# Patient Record
Sex: FEMALE | Race: WHITE | NOT HISPANIC OR LATINO | URBAN - METROPOLITAN AREA
[De-identification: names, ages, dates, MRNs, and addresses within clinical notes are randomized per-mention and may not be internally consistent; named-entity substitution may affect disease eponyms.]

---

## 2018-05-22 ENCOUNTER — EMERGENCY (EMERGENCY)
Facility: HOSPITAL | Age: 68
LOS: 0 days | Discharge: HOME | End: 2018-05-22
Attending: EMERGENCY MEDICINE
Payer: MEDICARE

## 2018-05-22 VITALS
SYSTOLIC BLOOD PRESSURE: 155 MMHG | HEIGHT: 66 IN | RESPIRATION RATE: 16 BRPM | DIASTOLIC BLOOD PRESSURE: 80 MMHG | OXYGEN SATURATION: 98 % | WEIGHT: 197.09 LBS | HEART RATE: 56 BPM | TEMPERATURE: 99 F

## 2018-05-22 PROCEDURE — 99283 EMERGENCY DEPT VISIT LOW MDM: CPT

## 2018-05-22 PROCEDURE — 73564 X-RAY EXAM KNEE 4 OR MORE: CPT | Mod: 26,LT

## 2018-05-22 PROCEDURE — 93971 EXTREMITY STUDY: CPT | Mod: 26

## 2018-05-22 RX ORDER — ACETAMINOPHEN 500 MG
2 TABLET ORAL
Qty: 40 | Refills: 0 | OUTPATIENT
Start: 2018-05-22 | End: 2018-05-26

## 2018-05-22 RX ORDER — TRAMADOL HYDROCHLORIDE 50 MG/1
1 TABLET ORAL
Qty: 12 | Refills: 0 | OUTPATIENT
Start: 2018-05-22 | End: 2018-05-24

## 2018-05-22 RX ORDER — ACETAMINOPHEN 500 MG
2 TABLET ORAL
Qty: 0 | Refills: 0 | COMMUNITY
Start: 2018-05-22 | End: 2018-05-26

## 2018-05-22 RX ORDER — TRAMADOL HYDROCHLORIDE 50 MG/1
50 TABLET ORAL ONCE
Qty: 0 | Refills: 0 | Status: DISCONTINUED | OUTPATIENT
Start: 2018-05-22 | End: 2018-05-22

## 2018-05-22 RX ADMIN — TRAMADOL HYDROCHLORIDE 50 MILLIGRAM(S): 50 TABLET ORAL at 16:47

## 2018-05-22 NOTE — ED ADULT TRIAGE NOTE - CHIEF COMPLAINT QUOTE
Pain to left knee  and not able to bear weight. While walking up the stairs my knee gave out. and she fell down to the step.

## 2018-05-22 NOTE — ED PROVIDER NOTE - MEDICAL DECISION MAKING DETAILS
pt with baker's cyst leading to fall and knee sprain to dc with follow up with orthopedics - Dr. House if not improved.

## 2018-05-23 DIAGNOSIS — M25.562 PAIN IN LEFT KNEE: ICD-10-CM

## 2018-05-23 DIAGNOSIS — S83.92XA SPRAIN OF UNSPECIFIED SITE OF LEFT KNEE, INITIAL ENCOUNTER: ICD-10-CM
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
## 2018-05-23 DIAGNOSIS — I10 ESSENTIAL (PRIMARY) HYPERTENSION: ICD-10-CM

## 2018-05-23 DIAGNOSIS — M71.22 SYNOVIAL CYST OF POPLITEAL SPACE [BAKER], LEFT KNEE: ICD-10-CM
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
## 2018-07-17 ENCOUNTER — OUTPATIENT (OUTPATIENT)
Dept: OUTPATIENT SERVICES | Facility: HOSPITAL | Age: 68
LOS: 1 days | Discharge: HOME | End: 2018-07-17
Payer: MEDICARE

## 2018-07-17 VITALS
TEMPERATURE: 98 F | OXYGEN SATURATION: 97 % | DIASTOLIC BLOOD PRESSURE: 77 MMHG | RESPIRATION RATE: 18 BRPM | HEART RATE: 57 BPM | WEIGHT: 203.05 LBS | SYSTOLIC BLOOD PRESSURE: 149 MMHG | HEIGHT: 65 IN

## 2018-07-17 DIAGNOSIS — M16.11 UNILATERAL PRIMARY OSTEOARTHRITIS, RIGHT HIP: ICD-10-CM

## 2018-07-17 DIAGNOSIS — I10 ESSENTIAL (PRIMARY) HYPERTENSION: ICD-10-CM

## 2018-07-17 DIAGNOSIS — M25.559 PAIN IN UNSPECIFIED HIP: ICD-10-CM

## 2018-07-17 DIAGNOSIS — Z01.818 ENCOUNTER FOR OTHER PREPROCEDURAL EXAMINATION: ICD-10-CM

## 2018-07-17 DIAGNOSIS — N39.3 STRESS INCONTINENCE (FEMALE) (MALE): Chronic | ICD-10-CM

## 2018-07-17 DIAGNOSIS — E78.5 HYPERLIPIDEMIA, UNSPECIFIED: ICD-10-CM

## 2018-07-17 LAB
ANION GAP SERPL CALC-SCNC: 7 MMOL/L — SIGNIFICANT CHANGE UP (ref 5–17)
APTT BLD: 34.1 SEC — SIGNIFICANT CHANGE UP (ref 27.5–37.4)
BASOPHILS # BLD AUTO: 0.06 K/UL — SIGNIFICANT CHANGE UP (ref 0–0.2)
BASOPHILS NFR BLD AUTO: 0.9 % — SIGNIFICANT CHANGE UP (ref 0–2)
BUN SERPL-MCNC: 19 MG/DL — SIGNIFICANT CHANGE UP (ref 7–23)
CALCIUM SERPL-MCNC: 8.5 MG/DL — SIGNIFICANT CHANGE UP (ref 8.5–10.1)
CHLORIDE SERPL-SCNC: 111 MMOL/L — HIGH (ref 96–108)
CO2 SERPL-SCNC: 24 MMOL/L — SIGNIFICANT CHANGE UP (ref 22–31)
CREAT SERPL-MCNC: 0.86 MG/DL — SIGNIFICANT CHANGE UP (ref 0.5–1.3)
EOSINOPHIL # BLD AUTO: 0.22 K/UL — SIGNIFICANT CHANGE UP (ref 0–0.5)
EOSINOPHIL NFR BLD AUTO: 3.2 % — SIGNIFICANT CHANGE UP (ref 0–6)
GLUCOSE SERPL-MCNC: 107 MG/DL — HIGH (ref 70–99)
HBA1C BLD-MCNC: 6 % — HIGH (ref 4–5.6)
HCT VFR BLD CALC: 43.1 % — SIGNIFICANT CHANGE UP (ref 34.5–45)
HGB BLD-MCNC: 13.8 G/DL — SIGNIFICANT CHANGE UP (ref 11.5–15.5)
IMM GRANULOCYTES NFR BLD AUTO: 0.1 % — SIGNIFICANT CHANGE UP (ref 0–1.5)
INR BLD: 1.02 RATIO — SIGNIFICANT CHANGE UP (ref 0.88–1.16)
LYMPHOCYTES # BLD AUTO: 2.57 K/UL — SIGNIFICANT CHANGE UP (ref 1–3.3)
LYMPHOCYTES # BLD AUTO: 37.8 % — SIGNIFICANT CHANGE UP (ref 13–44)
MCHC RBC-ENTMCNC: 29.2 PG — SIGNIFICANT CHANGE UP (ref 27–34)
MCHC RBC-ENTMCNC: 32 GM/DL — SIGNIFICANT CHANGE UP (ref 32–36)
MCV RBC AUTO: 91.1 FL — SIGNIFICANT CHANGE UP (ref 80–100)
MONOCYTES # BLD AUTO: 0.51 K/UL — SIGNIFICANT CHANGE UP (ref 0–0.9)
MONOCYTES NFR BLD AUTO: 7.5 % — SIGNIFICANT CHANGE UP (ref 2–14)
MRSA PCR RESULT.: SIGNIFICANT CHANGE UP
NEUTROPHILS # BLD AUTO: 3.42 K/UL — SIGNIFICANT CHANGE UP (ref 1.8–7.4)
NEUTROPHILS NFR BLD AUTO: 50.5 % — SIGNIFICANT CHANGE UP (ref 43–77)
PLATELET # BLD AUTO: 254 K/UL — SIGNIFICANT CHANGE UP (ref 150–400)
POTASSIUM SERPL-MCNC: 4.2 MMOL/L — SIGNIFICANT CHANGE UP (ref 3.5–5.3)
POTASSIUM SERPL-SCNC: 4.2 MMOL/L — SIGNIFICANT CHANGE UP (ref 3.5–5.3)
PROTHROM AB SERPL-ACNC: 11.1 SEC — SIGNIFICANT CHANGE UP (ref 9.8–12.7)
RBC # BLD: 4.73 M/UL — SIGNIFICANT CHANGE UP (ref 3.8–5.2)
RBC # FLD: 14.3 % — SIGNIFICANT CHANGE UP (ref 10.3–14.5)
S AUREUS DNA NOSE QL NAA+PROBE: DETECTED
SODIUM SERPL-SCNC: 142 MMOL/L — SIGNIFICANT CHANGE UP (ref 135–145)
WBC # BLD: 6.79 K/UL — SIGNIFICANT CHANGE UP (ref 3.8–10.5)
WBC # FLD AUTO: 6.79 K/UL — SIGNIFICANT CHANGE UP (ref 3.8–10.5)

## 2018-07-17 PROCEDURE — 93010 ELECTROCARDIOGRAM REPORT: CPT | Mod: NC

## 2018-07-17 NOTE — PHYSICAL THERAPY INITIAL EVALUATION ADULT - ADDITIONAL COMMENTS
Pt is R hand dominant, wears glasses, drives. Pt ambulates with a rollator, also uses a straight cane and a rolling walker. pt uses a brace to L knee following torn meniscus. There is a ramp to enter home, 13 steps  to 2nd floor however pt has emptied a room and can stay on the first floor. Bathroom is handicap accessible, tub shower combo, hand held shower head, grab bars and a raised toilet seat. pain is 0/10 at rest can increase to 3/10 with ambulation. Pt currently takes advil, tylenol, aleve, naproxen. no previous PT/OT.

## 2018-07-17 NOTE — H&P PST ADULT - PMH
HTN (hypertension)    Hyperlipidemia HTN (hypertension)    Hyperlipidemia    Obesity (BMI 30.0-34.9)

## 2018-07-17 NOTE — OCCUPATIONAL THERAPY INITIAL EVALUATION ADULT - COORDINATION ASSESSED, REHAB EVAL
finger to nose/heel to shin/intact in BUE; diminished in right LE heel to shin/finger to nose/intact in BUE; diminished in right LE

## 2018-07-17 NOTE — OCCUPATIONAL THERAPY INITIAL EVALUATION ADULT - ADDITIONAL COMMENTS
Pt is functioning in her roles, self sufficient, driving & ambulating independently in the community with a rollator; pt intermittently uses a SAC or rolling walker depending on the intensity of pain in her right hip. Pt takes over the counter medication PRN for pain relief. Pt denied pain at rest, but c/o 3/10 with ambulation.  Pt scores 90% of patient specific scale Pt is right hand dominant and wears glasses for reading.

## 2018-07-17 NOTE — PHYSICAL THERAPY INITIAL EVALUATION ADULT - RANGE OF MOTION EXAMINATION, REHAB EVAL
R hip AROM: flexion = 50 degrees, abduction = 5 degrees. L hip AROM: flexion = 65 degrees, abduction = 10 degrees.

## 2018-07-17 NOTE — OCCUPATIONAL THERAPY INITIAL EVALUATION ADULT - LIVES WITH, PROFILE
her 98 y/o  mother  in a private house with a ramp. Once inside, pt has to negotiate a flight of stairs to access the bedroom and bathroom. Pt has an empty room on the main floor where  she plans to stay and recover after surgery. The bathroom on the main floor is handicapped accessible , with raise tooilet seat , grab bars , tub/shower combination with shower chair. her 98 y/o mother in a private house with a ramp. Once inside, pt has to negotiate a flight of stairs to access the bedroom and bathroom. Pt has an empty room on the main floor where she plans to stay and recover after surgery. The bathroom on the main floor is handicapped accessible, with raised toilet seat , grab bars , tub/shower combination and shower chair.

## 2018-07-17 NOTE — OCCUPATIONAL THERAPY INITIAL EVALUATION ADULT - SOCIAL CONCERNS
Complex psychosocial needs/coping issues/Pt voiced concerns about her recovery at home, because her elderly mother does not like any one to come to her house.

## 2018-07-17 NOTE — OCCUPATIONAL THERAPY INITIAL EVALUATION ADULT - PERTINENT HX OF CURRENT PROBLEM, REHAB EVAL
Pt is slated for elective surgery for right THR due to OA, pain and DJD. Pt has a history of multiple comorbidities.

## 2018-07-17 NOTE — OCCUPATIONAL THERAPY INITIAL EVALUATION ADULT - RANGE OF MOTION EXAMINATION, LOWER EXTREMITY
Right LE Active Assistive ROM was WFL  (within functional limits)/Right LE Passive ROM was WFL  (within functional limits)/Left LE Passive ROM was WNL (within normal limits)/Left LE Active ROM was WNL  (within normal limits)

## 2018-07-17 NOTE — OCCUPATIONAL THERAPY INITIAL EVALUATION ADULT - ANTICIPATED DISCHARGE DISPOSITION, OT EVAL
Recommend home with 3-in-1 commode  and OT referral to enable patient to safely perform ADL management and functional mobility. Pt owns a rolling walker .

## 2018-07-17 NOTE — PHYSICAL THERAPY INITIAL EVALUATION ADULT - MODIFIED CLINICAL TEST OF SENSORY INTEGRATION IN BALANCE TEST
5x Sit to Stand Test = (B hands used) 24.97 seconds, indicating significant impairment c functional mobility & strength  ; 2 Minute Walk Test = 325 feet with rollator and rest stops

## 2018-07-18 PROCEDURE — 73502 X-RAY EXAM HIP UNI 2-3 VIEWS: CPT | Mod: 26,RT

## 2018-07-18 RX ORDER — MUPIROCIN 20 MG/G
1 OINTMENT TOPICAL
Qty: 1 | Refills: 0 | OUTPATIENT
Start: 2018-07-18 | End: 2018-07-22

## 2018-07-27 RX ORDER — SODIUM CHLORIDE 9 MG/ML
3 INJECTION INTRAMUSCULAR; INTRAVENOUS; SUBCUTANEOUS EVERY 8 HOURS
Qty: 0 | Refills: 0 | Status: DISCONTINUED | OUTPATIENT
Start: 2018-07-30 | End: 2018-08-01

## 2018-07-29 ENCOUNTER — TRANSCRIPTION ENCOUNTER (OUTPATIENT)
Age: 68
End: 2018-07-29

## 2018-07-30 ENCOUNTER — INPATIENT (INPATIENT)
Facility: HOSPITAL | Age: 68
LOS: 1 days | Discharge: NURSING, REHAB OR LONG-TERM CARE | End: 2018-08-01
Attending: ORTHOPAEDIC SURGERY | Admitting: ORTHOPAEDIC SURGERY
Payer: MEDICARE

## 2018-07-30 ENCOUNTER — RESULT REVIEW (OUTPATIENT)
Age: 68
End: 2018-07-30

## 2018-07-30 VITALS
HEIGHT: 65 IN | SYSTOLIC BLOOD PRESSURE: 126 MMHG | DIASTOLIC BLOOD PRESSURE: 55 MMHG | HEART RATE: 56 BPM | RESPIRATION RATE: 16 BRPM | OXYGEN SATURATION: 96 % | WEIGHT: 199.96 LBS | TEMPERATURE: 98 F

## 2018-07-30 DIAGNOSIS — N39.3 STRESS INCONTINENCE (FEMALE) (MALE): Chronic | ICD-10-CM

## 2018-07-30 LAB
ANION GAP SERPL CALC-SCNC: 8 MMOL/L — SIGNIFICANT CHANGE UP (ref 5–17)
BUN SERPL-MCNC: 20 MG/DL — SIGNIFICANT CHANGE UP (ref 7–23)
CALCIUM SERPL-MCNC: 8.5 MG/DL — SIGNIFICANT CHANGE UP (ref 8.5–10.1)
CHLORIDE SERPL-SCNC: 108 MMOL/L — SIGNIFICANT CHANGE UP (ref 96–108)
CO2 SERPL-SCNC: 26 MMOL/L — SIGNIFICANT CHANGE UP (ref 22–31)
CREAT SERPL-MCNC: 0.84 MG/DL — SIGNIFICANT CHANGE UP (ref 0.5–1.3)
GLUCOSE SERPL-MCNC: 100 MG/DL — HIGH (ref 70–99)
HCT VFR BLD CALC: 40 % — SIGNIFICANT CHANGE UP (ref 34.5–45)
HGB BLD-MCNC: 12.8 G/DL — SIGNIFICANT CHANGE UP (ref 11.5–15.5)
MCHC RBC-ENTMCNC: 29.4 PG — SIGNIFICANT CHANGE UP (ref 27–34)
MCHC RBC-ENTMCNC: 32 GM/DL — SIGNIFICANT CHANGE UP (ref 32–36)
MCV RBC AUTO: 91.7 FL — SIGNIFICANT CHANGE UP (ref 80–100)
NRBC # BLD: 0 /100 WBCS — SIGNIFICANT CHANGE UP (ref 0–0)
PLATELET # BLD AUTO: 212 K/UL — SIGNIFICANT CHANGE UP (ref 150–400)
POTASSIUM SERPL-MCNC: 3.5 MMOL/L — SIGNIFICANT CHANGE UP (ref 3.5–5.3)
POTASSIUM SERPL-SCNC: 3.5 MMOL/L — SIGNIFICANT CHANGE UP (ref 3.5–5.3)
RBC # BLD: 4.36 M/UL — SIGNIFICANT CHANGE UP (ref 3.8–5.2)
RBC # FLD: 14.1 % — SIGNIFICANT CHANGE UP (ref 10.3–14.5)
SODIUM SERPL-SCNC: 142 MMOL/L — SIGNIFICANT CHANGE UP (ref 135–145)
WBC # BLD: 9.32 K/UL — SIGNIFICANT CHANGE UP (ref 3.8–10.5)
WBC # FLD AUTO: 9.32 K/UL — SIGNIFICANT CHANGE UP (ref 3.8–10.5)

## 2018-07-30 PROCEDURE — 88311 DECALCIFY TISSUE: CPT | Mod: 26

## 2018-07-30 PROCEDURE — 88305 TISSUE EXAM BY PATHOLOGIST: CPT | Mod: 26

## 2018-07-30 RX ORDER — METOPROLOL TARTRATE 50 MG
25 TABLET ORAL
Qty: 0 | Refills: 0 | Status: DISCONTINUED | OUTPATIENT
Start: 2018-07-30 | End: 2018-08-01

## 2018-07-30 RX ORDER — ENOXAPARIN SODIUM 100 MG/ML
40 INJECTION SUBCUTANEOUS EVERY 24 HOURS
Qty: 0 | Refills: 0 | Status: DISCONTINUED | OUTPATIENT
Start: 2018-07-31 | End: 2018-08-01

## 2018-07-30 RX ORDER — ACETAMINOPHEN 500 MG
650 TABLET ORAL EVERY 6 HOURS
Qty: 0 | Refills: 0 | Status: DISCONTINUED | OUTPATIENT
Start: 2018-07-30 | End: 2018-08-01

## 2018-07-30 RX ORDER — CEFAZOLIN SODIUM 1 G
2000 VIAL (EA) INJECTION EVERY 8 HOURS
Qty: 0 | Refills: 0 | Status: COMPLETED | OUTPATIENT
Start: 2018-07-30 | End: 2018-07-31

## 2018-07-30 RX ORDER — SENNA PLUS 8.6 MG/1
2 TABLET ORAL AT BEDTIME
Qty: 0 | Refills: 0 | Status: DISCONTINUED | OUTPATIENT
Start: 2018-07-30 | End: 2018-08-01

## 2018-07-30 RX ORDER — LOSARTAN POTASSIUM 100 MG/1
50 TABLET, FILM COATED ORAL DAILY
Qty: 0 | Refills: 0 | Status: DISCONTINUED | OUTPATIENT
Start: 2018-07-30 | End: 2018-08-01

## 2018-07-30 RX ORDER — MORPHINE SULFATE 50 MG/1
4 CAPSULE, EXTENDED RELEASE ORAL EVERY 4 HOURS
Qty: 0 | Refills: 0 | Status: DISCONTINUED | OUTPATIENT
Start: 2018-07-30 | End: 2018-08-01

## 2018-07-30 RX ORDER — OXYCODONE HYDROCHLORIDE 5 MG/1
5 TABLET ORAL EVERY 4 HOURS
Qty: 0 | Refills: 0 | Status: DISCONTINUED | OUTPATIENT
Start: 2018-07-30 | End: 2018-08-01

## 2018-07-30 RX ORDER — HYDROCHLOROTHIAZIDE 25 MG
25 TABLET ORAL DAILY
Qty: 0 | Refills: 0 | Status: DISCONTINUED | OUTPATIENT
Start: 2018-07-30 | End: 2018-08-01

## 2018-07-30 RX ORDER — SODIUM CHLORIDE 9 MG/ML
1000 INJECTION, SOLUTION INTRAVENOUS
Qty: 0 | Refills: 0 | Status: DISCONTINUED | OUTPATIENT
Start: 2018-07-30 | End: 2018-07-31

## 2018-07-30 RX ORDER — ACETAMINOPHEN 500 MG
1000 TABLET ORAL ONCE
Qty: 0 | Refills: 0 | Status: DISCONTINUED | OUTPATIENT
Start: 2018-07-30 | End: 2018-08-01

## 2018-07-30 RX ORDER — ONDANSETRON 8 MG/1
4 TABLET, FILM COATED ORAL ONCE
Qty: 0 | Refills: 0 | Status: DISCONTINUED | OUTPATIENT
Start: 2018-07-30 | End: 2018-07-30

## 2018-07-30 RX ORDER — ACETAMINOPHEN 500 MG
650 TABLET ORAL ONCE
Qty: 0 | Refills: 0 | Status: COMPLETED | OUTPATIENT
Start: 2018-07-30 | End: 2018-07-30

## 2018-07-30 RX ORDER — OXYCODONE HYDROCHLORIDE 5 MG/1
10 TABLET ORAL ONCE
Qty: 0 | Refills: 0 | Status: DISCONTINUED | OUTPATIENT
Start: 2018-07-30 | End: 2018-07-30

## 2018-07-30 RX ORDER — OXYCODONE HYDROCHLORIDE 5 MG/1
10 TABLET ORAL EVERY 4 HOURS
Qty: 0 | Refills: 0 | Status: DISCONTINUED | OUTPATIENT
Start: 2018-07-30 | End: 2018-08-01

## 2018-07-30 RX ORDER — ATORVASTATIN CALCIUM 80 MG/1
20 TABLET, FILM COATED ORAL AT BEDTIME
Qty: 0 | Refills: 0 | Status: DISCONTINUED | OUTPATIENT
Start: 2018-07-30 | End: 2018-08-01

## 2018-07-30 RX ORDER — ACETAMINOPHEN 500 MG
1000 TABLET ORAL ONCE
Qty: 0 | Refills: 0 | Status: COMPLETED | OUTPATIENT
Start: 2018-07-30 | End: 2018-07-30

## 2018-07-30 RX ORDER — POLYETHYLENE GLYCOL 3350 17 G/17G
17 POWDER, FOR SOLUTION ORAL DAILY
Qty: 0 | Refills: 0 | Status: DISCONTINUED | OUTPATIENT
Start: 2018-07-30 | End: 2018-08-01

## 2018-07-30 RX ORDER — DIPHENHYDRAMINE HCL 50 MG
25 CAPSULE ORAL EVERY 4 HOURS
Qty: 0 | Refills: 0 | Status: DISCONTINUED | OUTPATIENT
Start: 2018-07-30 | End: 2018-08-01

## 2018-07-30 RX ORDER — SODIUM CHLORIDE 9 MG/ML
1000 INJECTION, SOLUTION INTRAVENOUS
Qty: 0 | Refills: 0 | Status: DISCONTINUED | OUTPATIENT
Start: 2018-07-30 | End: 2018-07-30

## 2018-07-30 RX ORDER — BENZOCAINE AND MENTHOL 5; 1 G/100ML; G/100ML
1 LIQUID ORAL
Qty: 0 | Refills: 0 | Status: DISCONTINUED | OUTPATIENT
Start: 2018-07-30 | End: 2018-08-01

## 2018-07-30 RX ORDER — DOCUSATE SODIUM 100 MG
100 CAPSULE ORAL THREE TIMES A DAY
Qty: 0 | Refills: 0 | Status: DISCONTINUED | OUTPATIENT
Start: 2018-07-30 | End: 2018-08-01

## 2018-07-30 RX ORDER — PANTOPRAZOLE SODIUM 20 MG/1
40 TABLET, DELAYED RELEASE ORAL DAILY
Qty: 0 | Refills: 0 | Status: DISCONTINUED | OUTPATIENT
Start: 2018-07-30 | End: 2018-08-01

## 2018-07-30 RX ORDER — FENTANYL CITRATE 50 UG/ML
50 INJECTION INTRAVENOUS
Qty: 0 | Refills: 0 | Status: DISCONTINUED | OUTPATIENT
Start: 2018-07-30 | End: 2018-07-30

## 2018-07-30 RX ORDER — MAGNESIUM HYDROXIDE 400 MG/1
30 TABLET, CHEWABLE ORAL DAILY
Qty: 0 | Refills: 0 | Status: DISCONTINUED | OUTPATIENT
Start: 2018-07-30 | End: 2018-08-01

## 2018-07-30 RX ORDER — ONDANSETRON 8 MG/1
4 TABLET, FILM COATED ORAL EVERY 6 HOURS
Qty: 0 | Refills: 0 | Status: DISCONTINUED | OUTPATIENT
Start: 2018-07-30 | End: 2018-08-01

## 2018-07-30 RX ORDER — DIPHENHYDRAMINE HCL 50 MG
25 CAPSULE ORAL AT BEDTIME
Qty: 0 | Refills: 0 | Status: DISCONTINUED | OUTPATIENT
Start: 2018-07-30 | End: 2018-08-01

## 2018-07-30 RX ORDER — BUPROPION HYDROCHLORIDE 150 MG/1
150 TABLET, EXTENDED RELEASE ORAL DAILY
Qty: 0 | Refills: 0 | Status: DISCONTINUED | OUTPATIENT
Start: 2018-07-30 | End: 2018-08-01

## 2018-07-30 RX ADMIN — Medication 1000 MILLIGRAM(S): at 17:40

## 2018-07-30 RX ADMIN — Medication 100 MILLIGRAM(S): at 22:00

## 2018-07-30 RX ADMIN — SODIUM CHLORIDE 3 MILLILITER(S): 9 INJECTION INTRAMUSCULAR; INTRAVENOUS; SUBCUTANEOUS at 22:03

## 2018-07-30 RX ADMIN — Medication 650 MILLIGRAM(S): at 11:56

## 2018-07-30 RX ADMIN — SODIUM CHLORIDE 75 MILLILITER(S): 9 INJECTION, SOLUTION INTRAVENOUS at 17:40

## 2018-07-30 RX ADMIN — OXYCODONE HYDROCHLORIDE 10 MILLIGRAM(S): 5 TABLET ORAL at 11:56

## 2018-07-30 RX ADMIN — ATORVASTATIN CALCIUM 20 MILLIGRAM(S): 80 TABLET, FILM COATED ORAL at 22:00

## 2018-07-30 RX ADMIN — Medication 400 MILLIGRAM(S): at 17:20

## 2018-07-30 RX ADMIN — SODIUM CHLORIDE 75 MILLILITER(S): 9 INJECTION, SOLUTION INTRAVENOUS at 23:02

## 2018-07-30 RX ADMIN — Medication 1 TABLET(S): at 22:00

## 2018-07-30 RX ADMIN — OXYCODONE HYDROCHLORIDE 10 MILLIGRAM(S): 5 TABLET ORAL at 22:50

## 2018-07-30 RX ADMIN — Medication 100 MILLIGRAM(S): at 22:58

## 2018-07-30 RX ADMIN — OXYCODONE HYDROCHLORIDE 10 MILLIGRAM(S): 5 TABLET ORAL at 21:58

## 2018-07-30 NOTE — OCCUPATIONAL THERAPY INITIAL EVALUATION ADULT - PLANNED THERAPY INTERVENTIONS, OT EVAL
parent/caregiver training.../transfer training/balance training/ADL retraining/bed mobility training/ROM/strengthening

## 2018-07-30 NOTE — OCCUPATIONAL THERAPY INITIAL EVALUATION ADULT - RANGE OF MOTION EXAMINATION, LOWER EXTREMITY
Left LE Active ROM was WFL (within functional limits)/right LE hip precautions, decreased hip flexion/extension

## 2018-07-30 NOTE — OCCUPATIONAL THERAPY INITIAL EVALUATION ADULT - ADDITIONAL COMMENTS
PST-Lives With: her 98 y/o mother in a private house with a ramp. Once inside, pt has to negotiate a flight of stairs to access the bedroom and bathroom. Pt has an empty room on the main floor where she plans to stay and recover after surgery. The bathroom on the main floor is handicapped accessible, with raised toilet seat , grab bars , tub/shower combination and shower chair.

## 2018-07-30 NOTE — OCCUPATIONAL THERAPY INITIAL EVALUATION ADULT - NS ASR OT EQUIP NEEDS DISCH
bedside commode/rolling walker (5 inch wheels)/patient provided hip kit and educated on recommended equipment

## 2018-07-30 NOTE — PHYSICAL THERAPY INITIAL EVALUATION ADULT - COORDINATION ASSESSED, REHAB EVAL
sluggish movt RLe due to pain and weakness/heel to shin heel to shin/sluggish movt RLe due to pain and weakness

## 2018-07-30 NOTE — PHYSICAL THERAPY INITIAL EVALUATION ADULT - CRITERIA FOR SKILLED THERAPEUTIC INTERVENTIONS
functional limitations in following categories/risk reduction/prevention/rehab potential/anticipated discharge recommendation

## 2018-07-31 ENCOUNTER — TRANSCRIPTION ENCOUNTER (OUTPATIENT)
Age: 68
End: 2018-07-31

## 2018-07-31 LAB
ANION GAP SERPL CALC-SCNC: 10 MMOL/L — SIGNIFICANT CHANGE UP (ref 5–17)
BUN SERPL-MCNC: 19 MG/DL — SIGNIFICANT CHANGE UP (ref 7–23)
CALCIUM SERPL-MCNC: 9.1 MG/DL — SIGNIFICANT CHANGE UP (ref 8.5–10.1)
CHLORIDE SERPL-SCNC: 102 MMOL/L — SIGNIFICANT CHANGE UP (ref 96–108)
CO2 SERPL-SCNC: 29 MMOL/L — SIGNIFICANT CHANGE UP (ref 22–31)
CREAT SERPL-MCNC: 0.81 MG/DL — SIGNIFICANT CHANGE UP (ref 0.5–1.3)
GLUCOSE SERPL-MCNC: 131 MG/DL — HIGH (ref 70–99)
HCT VFR BLD CALC: 40.1 % — SIGNIFICANT CHANGE UP (ref 34.5–45)
HGB BLD-MCNC: 13 G/DL — SIGNIFICANT CHANGE UP (ref 11.5–15.5)
MCHC RBC-ENTMCNC: 29.3 PG — SIGNIFICANT CHANGE UP (ref 27–34)
MCHC RBC-ENTMCNC: 32.4 GM/DL — SIGNIFICANT CHANGE UP (ref 32–36)
MCV RBC AUTO: 90.5 FL — SIGNIFICANT CHANGE UP (ref 80–100)
NRBC # BLD: 0 /100 WBCS — SIGNIFICANT CHANGE UP (ref 0–0)
PLATELET # BLD AUTO: 245 K/UL — SIGNIFICANT CHANGE UP (ref 150–400)
POTASSIUM SERPL-MCNC: 3.6 MMOL/L — SIGNIFICANT CHANGE UP (ref 3.5–5.3)
POTASSIUM SERPL-SCNC: 3.6 MMOL/L — SIGNIFICANT CHANGE UP (ref 3.5–5.3)
RBC # BLD: 4.43 M/UL — SIGNIFICANT CHANGE UP (ref 3.8–5.2)
RBC # FLD: 13.9 % — SIGNIFICANT CHANGE UP (ref 10.3–14.5)
SODIUM SERPL-SCNC: 141 MMOL/L — SIGNIFICANT CHANGE UP (ref 135–145)
WBC # BLD: 11.55 K/UL — HIGH (ref 3.8–10.5)
WBC # FLD AUTO: 11.55 K/UL — HIGH (ref 3.8–10.5)

## 2018-07-31 RX ORDER — DOCUSATE SODIUM 100 MG
1 CAPSULE ORAL
Qty: 21 | Refills: 0
Start: 2018-07-31 | End: 2018-08-06

## 2018-07-31 RX ORDER — ENOXAPARIN SODIUM 100 MG/ML
40 INJECTION SUBCUTANEOUS
Qty: 30 | Refills: 0
Start: 2018-07-31 | End: 2018-08-29

## 2018-07-31 RX ORDER — ACETAMINOPHEN 500 MG
1 TABLET ORAL
Qty: 0 | Refills: 0 | COMMUNITY

## 2018-07-31 RX ADMIN — OXYCODONE HYDROCHLORIDE 10 MILLIGRAM(S): 5 TABLET ORAL at 12:37

## 2018-07-31 RX ADMIN — SODIUM CHLORIDE 3 MILLILITER(S): 9 INJECTION INTRAMUSCULAR; INTRAVENOUS; SUBCUTANEOUS at 13:44

## 2018-07-31 RX ADMIN — Medication 25 MILLIGRAM(S): at 18:54

## 2018-07-31 RX ADMIN — SODIUM CHLORIDE 75 MILLILITER(S): 9 INJECTION, SOLUTION INTRAVENOUS at 05:56

## 2018-07-31 RX ADMIN — LOSARTAN POTASSIUM 50 MILLIGRAM(S): 100 TABLET, FILM COATED ORAL at 05:54

## 2018-07-31 RX ADMIN — SODIUM CHLORIDE 3 MILLILITER(S): 9 INJECTION INTRAMUSCULAR; INTRAVENOUS; SUBCUTANEOUS at 21:20

## 2018-07-31 RX ADMIN — SODIUM CHLORIDE 3 MILLILITER(S): 9 INJECTION INTRAMUSCULAR; INTRAVENOUS; SUBCUTANEOUS at 05:55

## 2018-07-31 RX ADMIN — OXYCODONE HYDROCHLORIDE 10 MILLIGRAM(S): 5 TABLET ORAL at 13:24

## 2018-07-31 RX ADMIN — OXYCODONE HYDROCHLORIDE 10 MILLIGRAM(S): 5 TABLET ORAL at 22:20

## 2018-07-31 RX ADMIN — Medication 100 MILLIGRAM(S): at 05:54

## 2018-07-31 RX ADMIN — OXYCODONE HYDROCHLORIDE 10 MILLIGRAM(S): 5 TABLET ORAL at 23:20

## 2018-07-31 RX ADMIN — PANTOPRAZOLE SODIUM 40 MILLIGRAM(S): 20 TABLET, DELAYED RELEASE ORAL at 12:37

## 2018-07-31 RX ADMIN — Medication 100 MILLIGRAM(S): at 13:42

## 2018-07-31 RX ADMIN — POLYETHYLENE GLYCOL 3350 17 GRAM(S): 17 POWDER, FOR SOLUTION ORAL at 12:38

## 2018-07-31 RX ADMIN — Medication 1 TABLET(S): at 22:20

## 2018-07-31 RX ADMIN — Medication 1 TABLET(S): at 12:37

## 2018-07-31 RX ADMIN — BUPROPION HYDROCHLORIDE 150 MILLIGRAM(S): 150 TABLET, EXTENDED RELEASE ORAL at 12:38

## 2018-07-31 RX ADMIN — Medication 1 TABLET(S): at 05:54

## 2018-07-31 RX ADMIN — ENOXAPARIN SODIUM 40 MILLIGRAM(S): 100 INJECTION SUBCUTANEOUS at 05:54

## 2018-07-31 RX ADMIN — Medication 1 TABLET(S): at 13:42

## 2018-07-31 RX ADMIN — Medication 25 MILLIGRAM(S): at 05:54

## 2018-07-31 NOTE — DISCHARGE NOTE ADULT - NS AS ACTIVITY OBS
Walking-Outdoors allowed/Stairs allowed/Walking-Indoors allowed Walking-Indoors allowed/Stairs allowed/Walking-Outdoors allowed/No Heavy lifting/straining

## 2018-07-31 NOTE — DISCHARGE NOTE ADULT - MEDICATION SUMMARY - MEDICATIONS TO STOP TAKING
I will STOP taking the medications listed below when I get home from the hospital:    acetaminophen 500 mg oral tablet  -- 2 tab(s) by mouth 2 times a day, As Needed  -- This product contains acetaminophen.  Do not use  with any other product containing acetaminophen to prevent possible liver damage.    Aleve  -- 1 tab(s) by mouth once a day, As Needed    Tylenol 500 mg oral tablet  -- 1 dose(s) by mouth 2 times a day, As Needed    mupirocin 2% topical ointment  -- Apply on skin to affected area 2 times a day MDD:2 apply nasally bilateral  -- For external use only.

## 2018-07-31 NOTE — DISCHARGE NOTE ADULT - PATIENT PORTAL LINK FT
You can access the [facility] Patient Portal, offered by [facility], by registering with the following website: [email or web address]

## 2018-07-31 NOTE — DISCHARGE NOTE ADULT - MEDICATION SUMMARY - MEDICATIONS TO TAKE
I will START or STAY ON the medications listed below when I get home from the hospital:    buPROPion HCL SR  150 MG  -- 1 tab(s) by mouth once a day  -- Indication: For home med    Percocet 5/325 oral tablet  -- 1 tab(s) by mouth every 4 to 6 hours, As Needed -for severe pain MDD:6   -- Caution federal law prohibits the transfer of this drug to any person other  than the person for whom it was prescribed.  May cause drowsiness.  Alcohol may intensify this effect.  Use care when operating dangerous machinery.  This prescription cannot be refilled.  This product contains acetaminophen.  Do not use  with any other product containing acetaminophen to prevent possible liver damage.  Using more of this medication than prescribed may cause serious breathing problems.    -- Indication: For prn for pain    losartan 50 mg oral tablet  -- 1 tab(s) by mouth once a day  -- Indication: For home med    Lovenox 40 mg/0.4 mL injectable solution  -- 40 milligram(s) subcutaneously once a day,  For DVT PPX. Do not skip doses.  -- It is very important that you take or use this exactly as directed.  Do not skip doses or discontinue unless directed by your doctor.    -- Indication: For DVT PPx    rosuvastatin 5 mg oral tablet  -- 1 tab(s) by mouth every other day  -- Indication: For home med    Metoprolol Tartrate 25 mg oral tablet  -- 1 tab(s) by mouth 2 times a day  -- Indication: For home med    hydroCHLOROthiazide 25 mg oral tablet  -- 1 tab(s) by mouth once a day  -- Indication: For home med    Colace 100 mg oral capsule  -- 1 cap(s) by mouth 3 times a day   -- Medication should be taken with plenty of water.    -- Indication: For prn for constipation    Vitamin D3 1000 intl units oral capsule  -- 1 cap(s) by mouth once a day  -- Indication: For home med

## 2018-07-31 NOTE — DISCHARGE NOTE ADULT - HOSPITAL COURSE
The patient is a 67 year old F status post elective Total Hip Arthroplasty to the R hip after failing outpatient non-operative conservative management.  Patient presented to [facility] after being medically cleared for an elective surgical procedure. The patient was taken to the operating room on date mentioned above. Prophylactic antibiotics were started before the procedure and continued for 24 hours.  There were no complications during the procedure and patient tolerated the procedure well.  The patient was transferred to recovery room in stable condition and subsequently to surgical floor.  Patient was placed Lovenox for anticoagulation.  All home medications were continued.  The patient received physical therapy daily and daily labs were followed.  The dressing was kept clean, dry, intact.  The rest of the hospital stay was unremarkable. The patient is a 67 year old Female status post elective Total Hip Arthroplasty to the Right hip after failing outpatient non-operative conservative management.  Patient presented to [facility] after being medically cleared for an elective surgical procedure. The patient was taken to the operating room on date mentioned above. Prophylactic antibiotics were started before the procedure and continued for 24 hours.  There were no complications during the procedure and patient tolerated the procedure well.  The patient was transferred to recovery room in stable condition and subsequently to surgical floor.  Patient was placed Lovenox for anticoagulation.  All home medications were continued.  The patient received physical therapy daily and daily labs were followed.  The dressing was kept clean, dry, intact.  The rest of the hospital stay was unremarkable.

## 2018-07-31 NOTE — DISCHARGE NOTE ADULT - CARE PLAN
Principal Discharge DX:	Osteoarthritis  Goal:	Return to ADLs  Assessment and plan of treatment:	1.	Pain Control  2.	Walking with full weight bearing as tolerated, with assistive devices (walker/Cane as Needed). Posterior hip precautions. Abduction pillow while in bed.  3.	DVT Prophylaxis for 30 days (Lovenox 40mg once daily). Do not skip doses.  4.	PT as needed  5.	Follow up with Dr. Petersen as Outpatient in 10-14 Days after Discharge from the Hospital or Rehab. Call Office For Appointment.  6.	Remove Staples Post-Op Day 14, and Remove Dressing Post-Op Day 10, with Daily Dressing Changes as Need.  7.	Ice affected area as Needed  8.	Keep Dressing  Clean and dry. Principal Discharge DX:	Osteoarthritis  Goal:	Return to baseline ADLs  Assessment and plan of treatment:	1.	Pain Control  2.	Walking with full weight bearing as tolerated, with assistive devices (walker/Cane as Needed). Posterior hip precautions. Abduction pillow while in bed.  3.	DVT Prophylaxis for 30 days (Lovenox 40mg once daily). Do not skip doses.  4.	PT as needed  5.	Follow up with Dr. Petersen as Outpatient in 10-14 Days after Discharge from the Hospital or Rehab. Call Office For Appointment.  6.	Remove Staples Post-Op Day 14, and Remove Dressing Post-Op Day 10, with Daily Dressing Changes as Need.  7.	Ice affected area as Needed  8.	Keep Dressing  Clean and dry.

## 2018-07-31 NOTE — DISCHARGE NOTE ADULT - PLAN OF CARE
Return to ADLs 1.	Pain Control  2.	Walking with full weight bearing as tolerated, with assistive devices (walker/Cane as Needed). Posterior hip precautions. Abduction pillow while in bed.  3.	DVT Prophylaxis for 30 days (Lovenox 40mg once daily). Do not skip doses.  4.	PT as needed  5.	Follow up with Dr. Petersen as Outpatient in 10-14 Days after Discharge from the Hospital or Rehab. Call Office For Appointment.  6.	Remove Staples Post-Op Day 14, and Remove Dressing Post-Op Day 10, with Daily Dressing Changes as Need.  7.	Ice affected area as Needed  8.	Keep Dressing  Clean and dry. Return to baseline ADLs

## 2018-07-31 NOTE — DISCHARGE NOTE ADULT - CARE PROVIDER_API CALL
Derrick Petersen (DO), Orthopaedic Surgery  [address] [place] [address]  Phone: [phone]  Fax: [phone]

## 2018-08-01 VITALS
RESPIRATION RATE: 17 BRPM | SYSTOLIC BLOOD PRESSURE: 129 MMHG | DIASTOLIC BLOOD PRESSURE: 58 MMHG | TEMPERATURE: 100 F | OXYGEN SATURATION: 95 % | HEART RATE: 72 BPM

## 2018-08-01 LAB
ANION GAP SERPL CALC-SCNC: 10 MMOL/L — SIGNIFICANT CHANGE UP (ref 5–17)
BUN SERPL-MCNC: 18 MG/DL — SIGNIFICANT CHANGE UP (ref 7–23)
CALCIUM SERPL-MCNC: 8.5 MG/DL — SIGNIFICANT CHANGE UP (ref 8.5–10.1)
CHLORIDE SERPL-SCNC: 105 MMOL/L — SIGNIFICANT CHANGE UP (ref 96–108)
CO2 SERPL-SCNC: 27 MMOL/L — SIGNIFICANT CHANGE UP (ref 22–31)
CREAT SERPL-MCNC: 0.68 MG/DL — SIGNIFICANT CHANGE UP (ref 0.5–1.3)
GLUCOSE SERPL-MCNC: 104 MG/DL — HIGH (ref 70–99)
HCT VFR BLD CALC: 35.7 % — SIGNIFICANT CHANGE UP (ref 34.5–45)
HGB BLD-MCNC: 11.7 G/DL — SIGNIFICANT CHANGE UP (ref 11.5–15.5)
MCHC RBC-ENTMCNC: 29.5 PG — SIGNIFICANT CHANGE UP (ref 27–34)
MCHC RBC-ENTMCNC: 32.8 GM/DL — SIGNIFICANT CHANGE UP (ref 32–36)
MCV RBC AUTO: 90.2 FL — SIGNIFICANT CHANGE UP (ref 80–100)
NRBC # BLD: 0 /100 WBCS — SIGNIFICANT CHANGE UP (ref 0–0)
PLATELET # BLD AUTO: 203 K/UL — SIGNIFICANT CHANGE UP (ref 150–400)
POTASSIUM SERPL-MCNC: 3.5 MMOL/L — SIGNIFICANT CHANGE UP (ref 3.5–5.3)
POTASSIUM SERPL-SCNC: 3.5 MMOL/L — SIGNIFICANT CHANGE UP (ref 3.5–5.3)
RBC # BLD: 3.96 M/UL — SIGNIFICANT CHANGE UP (ref 3.8–5.2)
RBC # FLD: 14.3 % — SIGNIFICANT CHANGE UP (ref 10.3–14.5)
SODIUM SERPL-SCNC: 142 MMOL/L — SIGNIFICANT CHANGE UP (ref 135–145)
WBC # BLD: 11.66 K/UL — HIGH (ref 3.8–10.5)
WBC # FLD AUTO: 11.66 K/UL — HIGH (ref 3.8–10.5)

## 2018-08-01 RX ADMIN — BUPROPION HYDROCHLORIDE 150 MILLIGRAM(S): 150 TABLET, EXTENDED RELEASE ORAL at 12:38

## 2018-08-01 RX ADMIN — Medication 1 TABLET(S): at 12:38

## 2018-08-01 RX ADMIN — POLYETHYLENE GLYCOL 3350 17 GRAM(S): 17 POWDER, FOR SOLUTION ORAL at 12:39

## 2018-08-01 RX ADMIN — Medication 650 MILLIGRAM(S): at 00:14

## 2018-08-01 RX ADMIN — Medication 100 MILLIGRAM(S): at 14:23

## 2018-08-01 RX ADMIN — Medication 1 TABLET(S): at 14:22

## 2018-08-01 RX ADMIN — OXYCODONE HYDROCHLORIDE 10 MILLIGRAM(S): 5 TABLET ORAL at 07:11

## 2018-08-01 RX ADMIN — ENOXAPARIN SODIUM 40 MILLIGRAM(S): 100 INJECTION SUBCUTANEOUS at 06:11

## 2018-08-01 RX ADMIN — Medication 1 TABLET(S): at 06:11

## 2018-08-01 RX ADMIN — PANTOPRAZOLE SODIUM 40 MILLIGRAM(S): 20 TABLET, DELAYED RELEASE ORAL at 12:42

## 2018-08-01 RX ADMIN — SODIUM CHLORIDE 3 MILLILITER(S): 9 INJECTION INTRAMUSCULAR; INTRAVENOUS; SUBCUTANEOUS at 05:33

## 2018-08-01 RX ADMIN — OXYCODONE HYDROCHLORIDE 10 MILLIGRAM(S): 5 TABLET ORAL at 06:11

## 2018-08-01 RX ADMIN — SODIUM CHLORIDE 3 MILLILITER(S): 9 INJECTION INTRAMUSCULAR; INTRAVENOUS; SUBCUTANEOUS at 14:22

## 2018-08-01 NOTE — PROGRESS NOTE ADULT - ASSESSMENT
A/P:  68 yo F s/p R KRISTIN POD 1:  -pain control  -dvt ppx  -PT/OT/WBAT w/ posterior hip precautions  -abduction pillow while in bed  -dispo planning  -will discuss w/ attending and update plan if any changes
A/P:  66 yo F s/p R KRISTIN POD 2:  -pain control  -dvt ppx  -PT/OT/WBAT w/ posterior hip precautions  -abduction pillow while in bed  -dispo planning to [facility]  -will discuss w/ attending and update plan if any changes
A/P:  68 yo F s/p R KRISTIN POD 0:  -f/u transfer to floor  -continue post op abx  -f/u am labs  -pain control  -dvt ppx begin 7/31  -PT/OT/WBAT w/ posterior hip precautions  -abduction pillow while in bed  -dispo planning

## 2018-08-01 NOTE — PROGRESS NOTE ADULT - SUBJECTIVE AND OBJECTIVE BOX
24 Hr Events:  Pt pain well controlled, no acute events overnight. No cp/sob/n/v.    Vital Signs Last 24 Hrs  T(C): 37.2 (01 Aug 2018 05:00), Max: 38.2 (01 Aug 2018 00:00)  T(F): 98.9 (01 Aug 2018 05:00), Max: 100.8 (01 Aug 2018 00:00)  HR: 72 (01 Aug 2018 05:00) (58 - 79)  BP: 106/55 (01 Aug 2018 05:00) (106/55 - 143/50)  BP(mean): --  RR: 17 (01 Aug 2018 05:00) (16 - 18)  SpO2: 98% (01 Aug 2018 05:00) (95% - 98%)    PE;  Gen: NAD    RLE:  Dressing CDI  compartments soft and compressible  no calf TTP  +EHL/FHL/Gsc/TA  SILT L3-S1  2+ DP
POST OP CHECK:  Pt in PACU, tolerated procedure well. Pain well controlled. Nerve block not completely worn off yet, has sensation to light touch, but still feels numb according to patient. VSS. Denies cp/sob/n/v.     Vital Signs Last 24 Hrs  T(C): 36.7 (30 Jul 2018 11:27), Max: 36.7 (30 Jul 2018 11:27)  T(F): 98 (30 Jul 2018 11:27), Max: 98 (30 Jul 2018 11:27)  HR: 52 (30 Jul 2018 17:30) (42 - 56)  BP: 134/68 (30 Jul 2018 17:30) (106/63 - 134/68)  BP(mean): --  RR: 17 (30 Jul 2018 17:30) (13 - 20)  SpO2: 99% (30 Jul 2018 17:30) (96% - 99%)    PE:  Gen: Pt laying comfortably in bed, NAD    RLE:  In abduction pillow  Dressing CDI  Compartments soft and compressible, no calf TTP  +EHL/FHL/TA/Gsc  SILT L3-S1  2+ DP  Brisk cap refill
24 Hr Events:  Pt doing well overnight. Pain has increased but well controlled. No acute events overnight, VSS. No cp/sob/n/v.    Vital Signs Last 24 Hrs  T(C): 36.6 (31 Jul 2018 01:15), Max: 36.7 (30 Jul 2018 11:27)  T(F): 97.8 (31 Jul 2018 01:15), Max: 98 (30 Jul 2018 11:27)  HR: 65 (31 Jul 2018 01:15) (42 - 65)  BP: 122/65 (31 Jul 2018 01:15) (106/63 - 134/77)  BP(mean): --  RR: 16 (31 Jul 2018 01:15) (13 - 23)  SpO2: 95% (31 Jul 2018 01:15) (95% - 99%)    PE:  Gen: Pt laying comfortably in bed, NAD    RLE:  Dressing CDI  Compartments soft and compressible, no calf TTP  +EHL/FHL/TA/Gsc  SILT L3-S1  2+ DP  Brisk cap refill

## 2018-08-02 ENCOUNTER — OUTPATIENT (OUTPATIENT)
Dept: OUTPATIENT SERVICES | Facility: HOSPITAL | Age: 68
LOS: 1 days | Discharge: HOME | End: 2018-08-02
Payer: MEDICARE

## 2018-08-02 DIAGNOSIS — N39.3 STRESS INCONTINENCE (FEMALE) (MALE): Chronic | ICD-10-CM

## 2018-08-02 DIAGNOSIS — R50.9 FEVER, UNSPECIFIED: ICD-10-CM

## 2018-08-02 PROBLEM — E66.9 OBESITY, UNSPECIFIED: Chronic | Status: ACTIVE | Noted: 2018-07-17

## 2018-08-02 PROBLEM — E78.5 HYPERLIPIDEMIA, UNSPECIFIED: Chronic | Status: ACTIVE | Noted: 2018-07-17

## 2018-08-02 PROBLEM — I10 ESSENTIAL (PRIMARY) HYPERTENSION: Chronic | Status: ACTIVE | Noted: 2018-07-17

## 2018-08-02 LAB — SURGICAL PATHOLOGY FINAL REPORT - CH: SIGNIFICANT CHANGE UP

## 2018-08-02 PROCEDURE — 71045 X-RAY EXAM CHEST 1 VIEW: CPT | Mod: 26

## 2018-08-06 DIAGNOSIS — F17.210 NICOTINE DEPENDENCE, CIGARETTES, UNCOMPLICATED: ICD-10-CM

## 2018-08-06 DIAGNOSIS — M71.22 SYNOVIAL CYST OF POPLITEAL SPACE [BAKER], LEFT KNEE: ICD-10-CM

## 2018-08-06 DIAGNOSIS — E78.5 HYPERLIPIDEMIA, UNSPECIFIED: ICD-10-CM

## 2018-08-06 DIAGNOSIS — M16.11 UNILATERAL PRIMARY OSTEOARTHRITIS, RIGHT HIP: ICD-10-CM

## 2018-08-06 DIAGNOSIS — E66.9 OBESITY, UNSPECIFIED: ICD-10-CM

## 2018-08-06 DIAGNOSIS — K59.00 CONSTIPATION, UNSPECIFIED: ICD-10-CM

## 2018-08-06 DIAGNOSIS — Z79.1 LONG TERM (CURRENT) USE OF NON-STEROIDAL ANTI-INFLAMMATORIES (NSAID): ICD-10-CM

## 2018-08-06 DIAGNOSIS — I10 ESSENTIAL (PRIMARY) HYPERTENSION: ICD-10-CM

## 2018-08-06 PROCEDURE — 72170 X-RAY EXAM OF PELVIS: CPT | Mod: 26

## 2020-12-30 NOTE — H&P PST ADULT - NS HEP C RISK YEAR OPTION
AVS provided and reviewed with the patient. The patient verbalized understanding of care at home, follow up care with Ortho (number highlighted and circled) and emergent symptoms to return for. The patient verbalized understanding of medication side effects and restrictions. Verbalized understanding of OCL care including circulation checks. No questions or concerns verbalized at this time. The patient is alert, oriented, stable, and ambulatory out of the department at the time of discharge. Prescription transmitted to the pharmacy the physician.      Valentín Moreno RN  12/30/20 [id number] Patient Refused

## 2021-08-25 NOTE — PHYSICAL THERAPY INITIAL EVALUATION ADULT - STANDING BALANCE: DYNAMIC, REHAB EVAL
fair balance/with rolling walker Propranolol Counseling:  I discussed with the patient the risks of propranolol including but not limited to low heart rate, low blood pressure, low blood sugar, restlessness and increased cold sensitivity. They should call the office if they experience any of these side effects.

## 2022-02-16 ENCOUNTER — TRANSCRIPTION ENCOUNTER (OUTPATIENT)
Age: 72
End: 2022-02-16

## 2022-02-17 ENCOUNTER — INPATIENT (INPATIENT)
Facility: HOSPITAL | Age: 72
LOS: 6 days | Discharge: HOME HEALTH | End: 2022-02-24
Attending: STUDENT IN AN ORGANIZED HEALTH CARE EDUCATION/TRAINING PROGRAM | Admitting: STUDENT IN AN ORGANIZED HEALTH CARE EDUCATION/TRAINING PROGRAM
Payer: MEDICARE

## 2022-02-17 ENCOUNTER — RESULT REVIEW (OUTPATIENT)
Age: 72
End: 2022-02-17

## 2022-02-17 VITALS
HEART RATE: 96 BPM | WEIGHT: 179.9 LBS | HEIGHT: 65 IN | SYSTOLIC BLOOD PRESSURE: 138 MMHG | DIASTOLIC BLOOD PRESSURE: 73 MMHG | TEMPERATURE: 98 F | RESPIRATION RATE: 16 BRPM | OXYGEN SATURATION: 100 %

## 2022-02-17 DIAGNOSIS — E83.42 HYPOMAGNESEMIA: ICD-10-CM

## 2022-02-17 DIAGNOSIS — I96 GANGRENE, NOT ELSEWHERE CLASSIFIED: ICD-10-CM

## 2022-02-17 DIAGNOSIS — Z23 ENCOUNTER FOR IMMUNIZATION: ICD-10-CM

## 2022-02-17 DIAGNOSIS — A41.9 SEPSIS, UNSPECIFIED ORGANISM: ICD-10-CM

## 2022-02-17 DIAGNOSIS — I10 ESSENTIAL (PRIMARY) HYPERTENSION: ICD-10-CM

## 2022-02-17 DIAGNOSIS — L02.212 CUTANEOUS ABSCESS OF BACK [ANY PART, EXCEPT BUTTOCK]: ICD-10-CM

## 2022-02-17 DIAGNOSIS — F17.210 NICOTINE DEPENDENCE, CIGARETTES, UNCOMPLICATED: ICD-10-CM

## 2022-02-17 DIAGNOSIS — D64.9 ANEMIA, UNSPECIFIED: ICD-10-CM

## 2022-02-17 DIAGNOSIS — E78.5 HYPERLIPIDEMIA, UNSPECIFIED: ICD-10-CM

## 2022-02-17 DIAGNOSIS — Z20.822 CONTACT WITH AND (SUSPECTED) EXPOSURE TO COVID-19: ICD-10-CM

## 2022-02-17 DIAGNOSIS — N39.3 STRESS INCONTINENCE (FEMALE) (MALE): Chronic | ICD-10-CM

## 2022-02-17 DIAGNOSIS — L72.9 FOLLICULAR CYST OF THE SKIN AND SUBCUTANEOUS TISSUE, UNSPECIFIED: ICD-10-CM

## 2022-02-17 DIAGNOSIS — E87.6 HYPOKALEMIA: ICD-10-CM

## 2022-02-17 LAB
ALBUMIN SERPL ELPH-MCNC: 2.2 G/DL — LOW (ref 3.3–5)
ALP SERPL-CCNC: 172 U/L — HIGH (ref 40–120)
ALT FLD-CCNC: 20 U/L — SIGNIFICANT CHANGE UP (ref 12–78)
ANION GAP SERPL CALC-SCNC: 7 MMOL/L — SIGNIFICANT CHANGE UP (ref 5–17)
ANION GAP SERPL CALC-SCNC: 9 MMOL/L — SIGNIFICANT CHANGE UP (ref 5–17)
ANISOCYTOSIS BLD QL: SIGNIFICANT CHANGE UP
APTT BLD: 30.3 SEC — SIGNIFICANT CHANGE UP (ref 27.5–35.5)
AST SERPL-CCNC: 17 U/L — SIGNIFICANT CHANGE UP (ref 15–37)
BASOPHILS # BLD AUTO: 0 K/UL — SIGNIFICANT CHANGE UP (ref 0–0.2)
BASOPHILS NFR BLD AUTO: 0 % — SIGNIFICANT CHANGE UP (ref 0–2)
BILIRUB SERPL-MCNC: 0.4 MG/DL — SIGNIFICANT CHANGE UP (ref 0.2–1.2)
BLD GP AB SCN SERPL QL: SIGNIFICANT CHANGE UP
BUN SERPL-MCNC: 14 MG/DL — SIGNIFICANT CHANGE UP (ref 7–23)
BUN SERPL-MCNC: 17 MG/DL — SIGNIFICANT CHANGE UP (ref 7–23)
CALCIUM SERPL-MCNC: 8.5 MG/DL — SIGNIFICANT CHANGE UP (ref 8.5–10.1)
CALCIUM SERPL-MCNC: 9 MG/DL — SIGNIFICANT CHANGE UP (ref 8.5–10.1)
CHLORIDE SERPL-SCNC: 102 MMOL/L — SIGNIFICANT CHANGE UP (ref 96–108)
CHLORIDE SERPL-SCNC: 107 MMOL/L — SIGNIFICANT CHANGE UP (ref 96–108)
CO2 SERPL-SCNC: 23 MMOL/L — SIGNIFICANT CHANGE UP (ref 22–31)
CO2 SERPL-SCNC: 28 MMOL/L — SIGNIFICANT CHANGE UP (ref 22–31)
CREAT SERPL-MCNC: 0.76 MG/DL — SIGNIFICANT CHANGE UP (ref 0.5–1.3)
CREAT SERPL-MCNC: 0.86 MG/DL — SIGNIFICANT CHANGE UP (ref 0.5–1.3)
CRP SERPL-MCNC: 327 MG/L — HIGH
DACRYOCYTES BLD QL SMEAR: SLIGHT — SIGNIFICANT CHANGE UP
ELLIPTOCYTES BLD QL SMEAR: SLIGHT — SIGNIFICANT CHANGE UP
EOSINOPHIL # BLD AUTO: 0 K/UL — SIGNIFICANT CHANGE UP (ref 0–0.5)
EOSINOPHIL NFR BLD AUTO: 0 % — SIGNIFICANT CHANGE UP (ref 0–6)
ERYTHROCYTE [SEDIMENTATION RATE] IN BLOOD: 86 MM/HR — HIGH (ref 0–20)
FLUAV AG NPH QL: SIGNIFICANT CHANGE UP
FLUBV AG NPH QL: SIGNIFICANT CHANGE UP
GLUCOSE SERPL-MCNC: 110 MG/DL — HIGH (ref 70–99)
GLUCOSE SERPL-MCNC: 116 MG/DL — HIGH (ref 70–99)
HCT VFR BLD CALC: 24.2 % — LOW (ref 34.5–45)
HCT VFR BLD CALC: 27.5 % — LOW (ref 34.5–45)
HGB BLD-MCNC: 6.5 G/DL — CRITICAL LOW (ref 11.5–15.5)
HGB BLD-MCNC: 7.6 G/DL — LOW (ref 11.5–15.5)
HYPOCHROMIA BLD QL: SIGNIFICANT CHANGE UP
INR BLD: 1.47 RATIO — HIGH (ref 0.88–1.16)
LACTATE SERPL-SCNC: 2.6 MMOL/L — HIGH (ref 0.7–2)
LACTATE SERPL-SCNC: 3 MMOL/L — HIGH (ref 0.7–2)
LYMPHOCYTES # BLD AUTO: 14 % — SIGNIFICANT CHANGE UP (ref 13–44)
LYMPHOCYTES # BLD AUTO: 3.29 K/UL — SIGNIFICANT CHANGE UP (ref 1–3.3)
MANUAL SMEAR VERIFICATION: SIGNIFICANT CHANGE UP
MCHC RBC-ENTMCNC: 16 PG — LOW (ref 27–34)
MCHC RBC-ENTMCNC: 17.6 PG — LOW (ref 27–34)
MCHC RBC-ENTMCNC: 26.9 G/DL — LOW (ref 32–36)
MCHC RBC-ENTMCNC: 27.6 G/DL — LOW (ref 32–36)
MCV RBC AUTO: 59.8 FL — LOW (ref 80–100)
MCV RBC AUTO: 63.8 FL — LOW (ref 80–100)
MICROCYTES BLD QL: SIGNIFICANT CHANGE UP
MONOCYTES # BLD AUTO: 1.65 K/UL — HIGH (ref 0–0.9)
MONOCYTES NFR BLD AUTO: 7 % — SIGNIFICANT CHANGE UP (ref 2–14)
NEUTROPHILS # BLD AUTO: 18.57 K/UL — HIGH (ref 1.8–7.4)
NEUTROPHILS NFR BLD AUTO: 79 % — HIGH (ref 43–77)
NRBC # BLD: 0 /100 WBCS — SIGNIFICANT CHANGE UP (ref 0–0)
NRBC # BLD: 0 /100 — SIGNIFICANT CHANGE UP (ref 0–0)
NRBC # BLD: SIGNIFICANT CHANGE UP /100 WBCS (ref 0–0)
OVALOCYTES BLD QL SMEAR: SLIGHT — SIGNIFICANT CHANGE UP
PLAT MORPH BLD: NORMAL — SIGNIFICANT CHANGE UP
PLATELET # BLD AUTO: 548 K/UL — HIGH (ref 150–400)
PLATELET # BLD AUTO: 675 K/UL — HIGH (ref 150–400)
POIKILOCYTOSIS BLD QL AUTO: SLIGHT — SIGNIFICANT CHANGE UP
POLYCHROMASIA BLD QL SMEAR: SLIGHT — SIGNIFICANT CHANGE UP
POTASSIUM SERPL-MCNC: 2.9 MMOL/L — CRITICAL LOW (ref 3.5–5.3)
POTASSIUM SERPL-MCNC: 3.3 MMOL/L — LOW (ref 3.5–5.3)
POTASSIUM SERPL-SCNC: 2.9 MMOL/L — CRITICAL LOW (ref 3.5–5.3)
POTASSIUM SERPL-SCNC: 3.3 MMOL/L — LOW (ref 3.5–5.3)
PROT SERPL-MCNC: 7.3 GM/DL — SIGNIFICANT CHANGE UP (ref 6–8.3)
PROTHROM AB SERPL-ACNC: 16.7 SEC — HIGH (ref 10.6–13.6)
RBC # BLD: 4.05 M/UL — SIGNIFICANT CHANGE UP (ref 3.8–5.2)
RBC # BLD: 4.31 M/UL — SIGNIFICANT CHANGE UP (ref 3.8–5.2)
RBC # FLD: 21 % — HIGH (ref 10.3–14.5)
RBC # FLD: 23.5 % — HIGH (ref 10.3–14.5)
RBC BLD AUTO: ABNORMAL
SARS-COV-2 RNA SPEC QL NAA+PROBE: SIGNIFICANT CHANGE UP
SODIUM SERPL-SCNC: 137 MMOL/L — SIGNIFICANT CHANGE UP (ref 135–145)
SODIUM SERPL-SCNC: 139 MMOL/L — SIGNIFICANT CHANGE UP (ref 135–145)
TARGETS BLD QL SMEAR: SLIGHT — SIGNIFICANT CHANGE UP
WBC # BLD: 20.4 K/UL — HIGH (ref 3.8–10.5)
WBC # BLD: 23.51 K/UL — HIGH (ref 3.8–10.5)
WBC # FLD AUTO: 20.4 K/UL — HIGH (ref 3.8–10.5)
WBC # FLD AUTO: 23.51 K/UL — HIGH (ref 3.8–10.5)

## 2022-02-17 PROCEDURE — 99285 EMERGENCY DEPT VISIT HI MDM: CPT

## 2022-02-17 PROCEDURE — 11042 DBRDMT SUBQ TIS 1ST 20SQCM/<: CPT

## 2022-02-17 PROCEDURE — 93010 ELECTROCARDIOGRAM REPORT: CPT

## 2022-02-17 PROCEDURE — 88305 TISSUE EXAM BY PATHOLOGIST: CPT | Mod: 26

## 2022-02-17 PROCEDURE — 71260 CT THORAX DX C+: CPT | Mod: 26,MA

## 2022-02-17 PROCEDURE — 71045 X-RAY EXAM CHEST 1 VIEW: CPT | Mod: 26

## 2022-02-17 RX ORDER — PIPERACILLIN AND TAZOBACTAM 4; .5 G/20ML; G/20ML
3.38 INJECTION, POWDER, LYOPHILIZED, FOR SOLUTION INTRAVENOUS EVERY 8 HOURS
Refills: 0 | Status: DISCONTINUED | OUTPATIENT
Start: 2022-02-17 | End: 2022-02-19

## 2022-02-17 RX ORDER — ACETAMINOPHEN 500 MG
1000 TABLET ORAL EVERY 6 HOURS
Refills: 0 | Status: COMPLETED | OUTPATIENT
Start: 2022-02-18 | End: 2022-02-18

## 2022-02-17 RX ORDER — ACETAMINOPHEN 500 MG
1000 TABLET ORAL ONCE
Refills: 0 | Status: DISCONTINUED | OUTPATIENT
Start: 2022-02-17 | End: 2022-02-17

## 2022-02-17 RX ORDER — SODIUM CHLORIDE 9 MG/ML
1000 INJECTION, SOLUTION INTRAVENOUS
Refills: 0 | Status: DISCONTINUED | OUTPATIENT
Start: 2022-02-17 | End: 2022-02-17

## 2022-02-17 RX ORDER — ACETAMINOPHEN 500 MG
1000 TABLET ORAL EVERY 6 HOURS
Refills: 0 | Status: COMPLETED | OUTPATIENT
Start: 2022-02-17 | End: 2022-02-17

## 2022-02-17 RX ORDER — METOPROLOL TARTRATE 50 MG
1 TABLET ORAL
Qty: 0 | Refills: 0 | DISCHARGE

## 2022-02-17 RX ORDER — ONDANSETRON 8 MG/1
4 TABLET, FILM COATED ORAL ONCE
Refills: 0 | Status: DISCONTINUED | OUTPATIENT
Start: 2022-02-17 | End: 2022-02-17

## 2022-02-17 RX ORDER — VANCOMYCIN HCL 1 G
1000 VIAL (EA) INTRAVENOUS ONCE
Refills: 0 | Status: COMPLETED | OUTPATIENT
Start: 2022-02-17 | End: 2022-02-17

## 2022-02-17 RX ORDER — SODIUM CHLORIDE 9 MG/ML
1000 INJECTION, SOLUTION INTRAVENOUS
Refills: 0 | Status: DISCONTINUED | OUTPATIENT
Start: 2022-02-17 | End: 2022-02-19

## 2022-02-17 RX ORDER — POTASSIUM CHLORIDE 20 MEQ
40 PACKET (EA) ORAL ONCE
Refills: 0 | Status: COMPLETED | OUTPATIENT
Start: 2022-02-17 | End: 2022-02-17

## 2022-02-17 RX ORDER — CHOLECALCIFEROL (VITAMIN D3) 125 MCG
1 CAPSULE ORAL
Qty: 0 | Refills: 0 | DISCHARGE

## 2022-02-17 RX ORDER — FENTANYL CITRATE 50 UG/ML
25 INJECTION INTRAVENOUS
Refills: 0 | Status: DISCONTINUED | OUTPATIENT
Start: 2022-02-17 | End: 2022-02-17

## 2022-02-17 RX ORDER — SIMVASTATIN 20 MG/1
1 TABLET, FILM COATED ORAL
Qty: 0 | Refills: 0 | DISCHARGE

## 2022-02-17 RX ORDER — TRAMADOL HYDROCHLORIDE 50 MG/1
25 TABLET ORAL EVERY 6 HOURS
Refills: 0 | Status: DISCONTINUED | OUTPATIENT
Start: 2022-02-17 | End: 2022-02-19

## 2022-02-17 RX ORDER — SODIUM CHLORIDE 9 MG/ML
1000 INJECTION, SOLUTION INTRAVENOUS ONCE
Refills: 0 | Status: COMPLETED | OUTPATIENT
Start: 2022-02-17 | End: 2022-02-17

## 2022-02-17 RX ORDER — HEPARIN SODIUM 5000 [USP'U]/ML
5000 INJECTION INTRAVENOUS; SUBCUTANEOUS EVERY 12 HOURS
Refills: 0 | Status: DISCONTINUED | OUTPATIENT
Start: 2022-02-17 | End: 2022-02-19

## 2022-02-17 RX ORDER — LOSARTAN POTASSIUM 100 MG/1
1 TABLET, FILM COATED ORAL
Qty: 0 | Refills: 0 | DISCHARGE

## 2022-02-17 RX ORDER — MORPHINE SULFATE 50 MG/1
4 CAPSULE, EXTENDED RELEASE ORAL ONCE
Refills: 0 | Status: DISCONTINUED | OUTPATIENT
Start: 2022-02-17 | End: 2022-02-17

## 2022-02-17 RX ORDER — VANCOMYCIN HCL 1 G
1000 VIAL (EA) INTRAVENOUS EVERY 12 HOURS
Refills: 0 | Status: DISCONTINUED | OUTPATIENT
Start: 2022-02-18 | End: 2022-02-19

## 2022-02-17 RX ORDER — ROSUVASTATIN CALCIUM 5 MG/1
1 TABLET ORAL
Qty: 0 | Refills: 0 | DISCHARGE

## 2022-02-17 RX ORDER — HYDROMORPHONE HYDROCHLORIDE 2 MG/ML
0.5 INJECTION INTRAMUSCULAR; INTRAVENOUS; SUBCUTANEOUS EVERY 6 HOURS
Refills: 0 | Status: DISCONTINUED | OUTPATIENT
Start: 2022-02-17 | End: 2022-02-19

## 2022-02-17 RX ORDER — PIPERACILLIN AND TAZOBACTAM 4; .5 G/20ML; G/20ML
3.38 INJECTION, POWDER, LYOPHILIZED, FOR SOLUTION INTRAVENOUS ONCE
Refills: 0 | Status: COMPLETED | OUTPATIENT
Start: 2022-02-17 | End: 2022-02-17

## 2022-02-17 RX ORDER — HEPARIN SODIUM 5000 [USP'U]/ML
5000 INJECTION INTRAVENOUS; SUBCUTANEOUS EVERY 12 HOURS
Refills: 0 | Status: DISCONTINUED | OUTPATIENT
Start: 2022-02-17 | End: 2022-02-17

## 2022-02-17 RX ORDER — POTASSIUM CHLORIDE 20 MEQ
20 PACKET (EA) ORAL
Refills: 0 | Status: COMPLETED | OUTPATIENT
Start: 2022-02-17 | End: 2022-02-17

## 2022-02-17 RX ADMIN — MORPHINE SULFATE 4 MILLIGRAM(S): 50 CAPSULE, EXTENDED RELEASE ORAL at 13:21

## 2022-02-17 RX ADMIN — SODIUM CHLORIDE 75 MILLILITER(S): 9 INJECTION, SOLUTION INTRAVENOUS at 19:14

## 2022-02-17 RX ADMIN — SODIUM CHLORIDE 110 MILLILITER(S): 9 INJECTION, SOLUTION INTRAVENOUS at 23:21

## 2022-02-17 RX ADMIN — Medication 250 MILLIGRAM(S): at 14:01

## 2022-02-17 RX ADMIN — Medication 400 MILLIGRAM(S): at 23:23

## 2022-02-17 RX ADMIN — SODIUM CHLORIDE 1000 MILLILITER(S): 9 INJECTION, SOLUTION INTRAVENOUS at 15:32

## 2022-02-17 RX ADMIN — PIPERACILLIN AND TAZOBACTAM 25 GRAM(S): 4; .5 INJECTION, POWDER, LYOPHILIZED, FOR SOLUTION INTRAVENOUS at 21:50

## 2022-02-17 RX ADMIN — Medication 40 MILLIEQUIVALENT(S): at 14:04

## 2022-02-17 RX ADMIN — PIPERACILLIN AND TAZOBACTAM 200 GRAM(S): 4; .5 INJECTION, POWDER, LYOPHILIZED, FOR SOLUTION INTRAVENOUS at 13:09

## 2022-02-17 RX ADMIN — MORPHINE SULFATE 4 MILLIGRAM(S): 50 CAPSULE, EXTENDED RELEASE ORAL at 13:56

## 2022-02-17 RX ADMIN — HEPARIN SODIUM 5000 UNIT(S): 5000 INJECTION INTRAVENOUS; SUBCUTANEOUS at 21:49

## 2022-02-17 RX ADMIN — SODIUM CHLORIDE 1000 MILLILITER(S): 9 INJECTION, SOLUTION INTRAVENOUS at 23:27

## 2022-02-17 RX ADMIN — Medication 20 MILLIEQUIVALENT(S): at 23:20

## 2022-02-17 RX ADMIN — Medication 20 MILLIEQUIVALENT(S): at 21:34

## 2022-02-17 NOTE — PATIENT PROFILE ADULT - FALL HARM RISK - HARM RISK INTERVENTIONS

## 2022-02-17 NOTE — H&P ADULT - HISTORY OF PRESENT ILLNESS
Patient is a 71 year old female cigarette smoker with PMH HTN, HLD who presents to the ED c/o enlarging, painful abscess on her upper back. Patient states that for years she's suffered from a small cyst on her back that she treated with antibiotics and warm compresses and always improved. Last week, patient noticed a recurrence of her lump and she saw her dermatologist who prescribed Keflex and Mupirocin ointment. Patient states that over the last 4 days, the lump has grown in size tremendously and became much more pain. She also c/o chills.  Denies fever, chest pain, sob, cough, recent travel, sick contacts.

## 2022-02-17 NOTE — PATIENT PROFILE ADULT - TOBACCO CESSATION EDUCATION/COUNSELLING(PROVIDED IF TOBACCO USED IN THE PAST 30 DAYS- CORE MEASURE SITES)
Detail Level: Zone Products Recommended: Sunscreen General Sunscreen Counseling: I recommended a broad spectrum sunscreen with a SPF of 30 or higher.  I explained that SPF 30 sunscreens block approximately 97 percent of the sun's harmful rays.  Sunscreens should be applied at least 15 minutes prior to expected sun exposure and then every 2 hours after that as long as sun exposure continues. If swimming or exercising sunscreen should be reapplied every 45 minutes to an hour after getting wet or sweating.  One ounce, or the equivalent of a shot glass full of sunscreen, is adequate to protect the skin not covered by a bathing suit. I also recommended a lip balm with a sunscreen as well. Sun protective clothing can be used in lieu of sunscreen but must be worn the entire time you are exposed to the sun's rays. Offered and patient declined

## 2022-02-17 NOTE — ED PROVIDER NOTE - MUSCULOSKELETAL MINIMAL EXAM
upper back with extensive 30cm x 30cm area with thinning of skin, fluctuance and small 3x4cm area or necrotic appearing/greenish discolored skin.

## 2022-02-17 NOTE — ED ADULT NURSE REASSESSMENT NOTE - NS ED NURSE REASSESS COMMENT FT1
1st unit of PRBC running. No adverse reaction noted at this time. VS stable. On continuous monitoring.

## 2022-02-17 NOTE — ED ADULT TRIAGE NOTE - CHIEF COMPLAINT QUOTE
pt c.o of infection to upper back ongoing x 1 week. abscess with necrotic tissues noted, redness, suspected cellulitis, warm to touch, foul smell. pmh HTN , HLD. seen at dermatologist prescribed oral abx Cephalexin .

## 2022-02-17 NOTE — ED PROVIDER NOTE - OBJECTIVE STATEMENT
71 year old female with PMH of HTN, HLD presenting to ED due to upper back swelling and abscess that has been growing and worsening with some fever/chills at home - states area started swelling x 4 days and then was on Cephalexin previously and was not improved despite oral abx so came into ED for evalutaion.

## 2022-02-17 NOTE — ED ADULT NURSE NOTE - OBJECTIVE STATEMENT
pt presented to ER, AOx4 and ambulatory, with complaints of wound check. As per pt, she first notice wound, located on upper back on Sunday. Pt states that wound began to grow monday and has been increasing in size each. Pt states that she went to PCP today and was prescribed oral antibiotic (cephalexin 500) and was told to report to ER for IV antibiotics. Pt reports constant pain 10/10 with movement.

## 2022-02-17 NOTE — BRIEF OPERATIVE NOTE - NSICDXBRIEFPROCEDURE_GEN_ALL_CORE_FT
PROCEDURES:  Drainage of abscess of superficial soft tissue 17-Feb-2022 19:25:35 back Reyes, Madeleine

## 2022-02-17 NOTE — H&P ADULT - BACK COMMENTS
Large (~[id number]) fluctuant mass with surrounding erythema. Areas of necrotic, blackened tissue at center. Malodorous. Tiny pores with minimal, spontaneous, drainage of purulent fluid. ++TTP.

## 2022-02-17 NOTE — H&P ADULT - PROBLEM SELECTOR PLAN 1
Admit  Preop for drainage in OR; NPO, COVID, labs  Case booked, consent in chart   IV fluid resuscitation   IV broad spectrum antibiotics  PRBC transfusion  Replete K   Pain meds PRN  Strict I/Os, guy   F/u blood cx, repeat lactate  Discussed with Dr. Bryan

## 2022-02-17 NOTE — ED PROVIDER NOTE - CARE PLAN
Principal Discharge DX:	Abscess of upper back excluding scapular region   1 Principal Discharge DX:	Abscess of upper back excluding scapular region  Secondary Diagnosis:	Leukocytosis

## 2022-02-17 NOTE — ED PROVIDER NOTE - CLINICAL SUMMARY MEDICAL DECISION MAKING FREE TEXT BOX
extensive upper back abscess with aggressive growth over 3-4 days - otherwise will need IV abx and surgical drainage - Surgery called for consultation early in course of ED visit - recommends CT of abscess then states will evaluate. extensive upper back abscess with aggressive growth over 3-4 days - otherwise will need IV abx and surgical drainage - Surgery called for consultation early in course of ED visit - recommends CT of abscess then states will evaluate.    Noted CT finding of gas forming abscess on upper back without noted extension to other territories - will admit to Surgery service for OR I&D of severe infection - given zosyn/vanco in ED and given potassium for K repletion, surgery evaluated pt will prep for OR.

## 2022-02-17 NOTE — H&P ADULT - ASSESSMENT
71F h/o HTN, HLD, recurrent back cyst a/w sepsis due to large, necrotic back abscess, anemia, hypokalemia

## 2022-02-18 LAB
ANION GAP SERPL CALC-SCNC: 6 MMOL/L — SIGNIFICANT CHANGE UP (ref 5–17)
BUN SERPL-MCNC: 13 MG/DL — SIGNIFICANT CHANGE UP (ref 7–23)
CALCIUM SERPL-MCNC: 8.6 MG/DL — SIGNIFICANT CHANGE UP (ref 8.5–10.1)
CHLORIDE SERPL-SCNC: 107 MMOL/L — SIGNIFICANT CHANGE UP (ref 96–108)
CO2 SERPL-SCNC: 26 MMOL/L — SIGNIFICANT CHANGE UP (ref 22–31)
CREAT SERPL-MCNC: 0.76 MG/DL — SIGNIFICANT CHANGE UP (ref 0.5–1.3)
GLUCOSE SERPL-MCNC: 136 MG/DL — HIGH (ref 70–99)
HCT VFR BLD CALC: 25.9 % — LOW (ref 34.5–45)
HCT VFR BLD CALC: 27.6 % — LOW (ref 34.5–45)
HCV AB S/CO SERPL IA: 0.52 S/CO — SIGNIFICANT CHANGE UP (ref 0–0.99)
HCV AB SERPL-IMP: SIGNIFICANT CHANGE UP
HGB BLD-MCNC: 7.5 G/DL — LOW (ref 11.5–15.5)
HGB BLD-MCNC: 8.2 G/DL — LOW (ref 11.5–15.5)
LACTATE SERPL-SCNC: 1.8 MMOL/L — SIGNIFICANT CHANGE UP (ref 0.7–2)
MAGNESIUM SERPL-MCNC: 3.1 MG/DL — HIGH (ref 1.6–2.6)
MCHC RBC-ENTMCNC: 18.8 PG — LOW (ref 27–34)
MCHC RBC-ENTMCNC: 19.9 PG — LOW (ref 27–34)
MCHC RBC-ENTMCNC: 29 G/DL — LOW (ref 32–36)
MCHC RBC-ENTMCNC: 29.7 G/DL — LOW (ref 32–36)
MCV RBC AUTO: 65.1 FL — LOW (ref 80–100)
MCV RBC AUTO: 67 FL — LOW (ref 80–100)
NRBC # BLD: 0 /100 WBCS — SIGNIFICANT CHANGE UP (ref 0–0)
NRBC # BLD: 0 /100 WBCS — SIGNIFICANT CHANGE UP (ref 0–0)
PHOSPHATE SERPL-MCNC: 3.3 MG/DL — SIGNIFICANT CHANGE UP (ref 2.5–4.5)
PLATELET # BLD AUTO: 503 K/UL — HIGH (ref 150–400)
PLATELET # BLD AUTO: 547 K/UL — HIGH (ref 150–400)
POTASSIUM SERPL-MCNC: 3.9 MMOL/L — SIGNIFICANT CHANGE UP (ref 3.5–5.3)
POTASSIUM SERPL-SCNC: 3.9 MMOL/L — SIGNIFICANT CHANGE UP (ref 3.5–5.3)
RBC # BLD: 3.98 M/UL — SIGNIFICANT CHANGE UP (ref 3.8–5.2)
RBC # BLD: 4.12 M/UL — SIGNIFICANT CHANGE UP (ref 3.8–5.2)
RBC # FLD: 24.6 % — HIGH (ref 10.3–14.5)
RBC # FLD: 25.9 % — HIGH (ref 10.3–14.5)
SODIUM SERPL-SCNC: 139 MMOL/L — SIGNIFICANT CHANGE UP (ref 135–145)
WBC # BLD: 16.52 K/UL — HIGH (ref 3.8–10.5)
WBC # BLD: 19.65 K/UL — HIGH (ref 3.8–10.5)
WBC # FLD AUTO: 16.52 K/UL — HIGH (ref 3.8–10.5)
WBC # FLD AUTO: 19.65 K/UL — HIGH (ref 3.8–10.5)

## 2022-02-18 RX ADMIN — TRAMADOL HYDROCHLORIDE 25 MILLIGRAM(S): 50 TABLET ORAL at 21:26

## 2022-02-18 RX ADMIN — Medication 1000 MILLIGRAM(S): at 21:26

## 2022-02-18 RX ADMIN — PIPERACILLIN AND TAZOBACTAM 25 GRAM(S): 4; .5 INJECTION, POWDER, LYOPHILIZED, FOR SOLUTION INTRAVENOUS at 21:22

## 2022-02-18 RX ADMIN — HEPARIN SODIUM 5000 UNIT(S): 5000 INJECTION INTRAVENOUS; SUBCUTANEOUS at 17:31

## 2022-02-18 RX ADMIN — Medication 250 MILLIGRAM(S): at 01:01

## 2022-02-18 RX ADMIN — Medication 1000 MILLIGRAM(S): at 08:55

## 2022-02-18 RX ADMIN — HEPARIN SODIUM 5000 UNIT(S): 5000 INJECTION INTRAVENOUS; SUBCUTANEOUS at 05:09

## 2022-02-18 RX ADMIN — Medication 1000 MILLIGRAM(S): at 14:08

## 2022-02-18 RX ADMIN — PIPERACILLIN AND TAZOBACTAM 25 GRAM(S): 4; .5 INJECTION, POWDER, LYOPHILIZED, FOR SOLUTION INTRAVENOUS at 05:09

## 2022-02-18 RX ADMIN — SODIUM CHLORIDE 110 MILLILITER(S): 9 INJECTION, SOLUTION INTRAVENOUS at 07:52

## 2022-02-18 RX ADMIN — TRAMADOL HYDROCHLORIDE 25 MILLIGRAM(S): 50 TABLET ORAL at 09:47

## 2022-02-18 RX ADMIN — Medication 250 MILLIGRAM(S): at 11:37

## 2022-02-18 RX ADMIN — Medication 1000 MILLIGRAM(S): at 00:00

## 2022-02-18 RX ADMIN — TRAMADOL HYDROCHLORIDE 25 MILLIGRAM(S): 50 TABLET ORAL at 20:31

## 2022-02-18 RX ADMIN — TRAMADOL HYDROCHLORIDE 25 MILLIGRAM(S): 50 TABLET ORAL at 14:09

## 2022-02-18 RX ADMIN — PIPERACILLIN AND TAZOBACTAM 25 GRAM(S): 4; .5 INJECTION, POWDER, LYOPHILIZED, FOR SOLUTION INTRAVENOUS at 11:36

## 2022-02-18 RX ADMIN — Medication 400 MILLIGRAM(S): at 07:45

## 2022-02-18 RX ADMIN — Medication 400 MILLIGRAM(S): at 11:36

## 2022-02-18 RX ADMIN — Medication 400 MILLIGRAM(S): at 20:23

## 2022-02-18 NOTE — CHART NOTE - NSCHARTNOTEFT_GEN_A_CORE
Called by RN as dressing has fallen off. Wound packed with yissel, packing removed, wound irrigated, no active bleeding or purulent discharge, wound repacked, moistened yissel,then covered with 4x4 ans abd. Pt tolerated well.

## 2022-02-18 NOTE — PHYSICAL THERAPY INITIAL EVALUATION ADULT - CRITERIA FOR SKILLED THERAPEUTIC INTERVENTIONS
Home, no Skilled PT needs, No DME needed/anticipated equipment needs at discharge/anticipated discharge recommendation

## 2022-02-18 NOTE — PHYSICAL THERAPY INITIAL EVALUATION ADULT - ADDITIONAL COMMENTS
As per pt, she lives alone in a house with ramp entrance, 1 flight of stairs with left rail going down to do laundry, She was independent in all functional mobility using no AD, PTA She has rollator, cane, transport WC, grab bars, shower chair from her mom

## 2022-02-19 LAB
ANION GAP SERPL CALC-SCNC: 7 MMOL/L — SIGNIFICANT CHANGE UP (ref 5–17)
BUN SERPL-MCNC: 18 MG/DL — SIGNIFICANT CHANGE UP (ref 7–23)
CALCIUM SERPL-MCNC: 8.5 MG/DL — SIGNIFICANT CHANGE UP (ref 8.5–10.1)
CHLORIDE SERPL-SCNC: 108 MMOL/L — SIGNIFICANT CHANGE UP (ref 96–108)
CO2 SERPL-SCNC: 26 MMOL/L — SIGNIFICANT CHANGE UP (ref 22–31)
CREAT SERPL-MCNC: 0.74 MG/DL — SIGNIFICANT CHANGE UP (ref 0.5–1.3)
CULTURE RESULTS: NO GROWTH — SIGNIFICANT CHANGE UP
CULTURE RESULTS: NO GROWTH — SIGNIFICANT CHANGE UP
GLUCOSE SERPL-MCNC: 92 MG/DL — SIGNIFICANT CHANGE UP (ref 70–99)
HCT VFR BLD CALC: 28.4 % — LOW (ref 34.5–45)
HGB BLD-MCNC: 8.1 G/DL — LOW (ref 11.5–15.5)
MAGNESIUM SERPL-MCNC: 3.1 MG/DL — HIGH (ref 1.6–2.6)
MCHC RBC-ENTMCNC: 19.9 PG — LOW (ref 27–34)
MCHC RBC-ENTMCNC: 28.5 G/DL — LOW (ref 32–36)
MCV RBC AUTO: 69.6 FL — LOW (ref 80–100)
NRBC # BLD: 0 /100 WBCS — SIGNIFICANT CHANGE UP (ref 0–0)
PHOSPHATE SERPL-MCNC: 3.4 MG/DL — SIGNIFICANT CHANGE UP (ref 2.5–4.5)
PLATELET # BLD AUTO: 511 K/UL — HIGH (ref 150–400)
POTASSIUM SERPL-MCNC: 3.7 MMOL/L — SIGNIFICANT CHANGE UP (ref 3.5–5.3)
POTASSIUM SERPL-SCNC: 3.7 MMOL/L — SIGNIFICANT CHANGE UP (ref 3.5–5.3)
RBC # BLD: 4.08 M/UL — SIGNIFICANT CHANGE UP (ref 3.8–5.2)
RBC # FLD: 25.7 % — HIGH (ref 10.3–14.5)
SODIUM SERPL-SCNC: 141 MMOL/L — SIGNIFICANT CHANGE UP (ref 135–145)
SPECIMEN SOURCE: SIGNIFICANT CHANGE UP
SPECIMEN SOURCE: SIGNIFICANT CHANGE UP
VANCOMYCIN TROUGH SERPL-MCNC: 14.1 UG/ML — SIGNIFICANT CHANGE UP (ref 10–20)
WBC # BLD: 9.87 K/UL — SIGNIFICANT CHANGE UP (ref 3.8–10.5)
WBC # FLD AUTO: 9.87 K/UL — SIGNIFICANT CHANGE UP (ref 3.8–10.5)

## 2022-02-19 RX ORDER — TRAMADOL HYDROCHLORIDE 50 MG/1
25 TABLET ORAL EVERY 6 HOURS
Refills: 0 | Status: DISCONTINUED | OUTPATIENT
Start: 2022-02-19 | End: 2022-02-24

## 2022-02-19 RX ORDER — ACETAMINOPHEN 500 MG
1000 TABLET ORAL ONCE
Refills: 0 | Status: COMPLETED | OUTPATIENT
Start: 2022-02-19 | End: 2022-02-19

## 2022-02-19 RX ORDER — HYDROMORPHONE HYDROCHLORIDE 2 MG/ML
0.5 INJECTION INTRAMUSCULAR; INTRAVENOUS; SUBCUTANEOUS EVERY 6 HOURS
Refills: 0 | Status: DISCONTINUED | OUTPATIENT
Start: 2022-02-19 | End: 2022-02-24

## 2022-02-19 RX ORDER — HEPARIN SODIUM 5000 [USP'U]/ML
5000 INJECTION INTRAVENOUS; SUBCUTANEOUS EVERY 12 HOURS
Refills: 0 | Status: DISCONTINUED | OUTPATIENT
Start: 2022-02-19 | End: 2022-02-24

## 2022-02-19 RX ORDER — ONDANSETRON 8 MG/1
4 TABLET, FILM COATED ORAL ONCE
Refills: 0 | Status: DISCONTINUED | OUTPATIENT
Start: 2022-02-19 | End: 2022-02-24

## 2022-02-19 RX ORDER — VANCOMYCIN HCL 1 G
1000 VIAL (EA) INTRAVENOUS EVERY 12 HOURS
Refills: 0 | Status: DISCONTINUED | OUTPATIENT
Start: 2022-02-19 | End: 2022-02-24

## 2022-02-19 RX ORDER — PIPERACILLIN AND TAZOBACTAM 4; .5 G/20ML; G/20ML
3.38 INJECTION, POWDER, LYOPHILIZED, FOR SOLUTION INTRAVENOUS EVERY 8 HOURS
Refills: 0 | Status: DISCONTINUED | OUTPATIENT
Start: 2022-02-19 | End: 2022-02-24

## 2022-02-19 RX ADMIN — PIPERACILLIN AND TAZOBACTAM 25 GRAM(S): 4; .5 INJECTION, POWDER, LYOPHILIZED, FOR SOLUTION INTRAVENOUS at 22:17

## 2022-02-19 RX ADMIN — PIPERACILLIN AND TAZOBACTAM 25 GRAM(S): 4; .5 INJECTION, POWDER, LYOPHILIZED, FOR SOLUTION INTRAVENOUS at 06:34

## 2022-02-19 RX ADMIN — Medication 400 MILLIGRAM(S): at 18:10

## 2022-02-19 RX ADMIN — Medication 250 MILLIGRAM(S): at 18:46

## 2022-02-19 RX ADMIN — Medication 30 MILLILITER(S): at 20:56

## 2022-02-19 RX ADMIN — HEPARIN SODIUM 5000 UNIT(S): 5000 INJECTION INTRAVENOUS; SUBCUTANEOUS at 06:20

## 2022-02-19 RX ADMIN — HEPARIN SODIUM 5000 UNIT(S): 5000 INJECTION INTRAVENOUS; SUBCUTANEOUS at 18:11

## 2022-02-19 RX ADMIN — Medication 250 MILLIGRAM(S): at 01:27

## 2022-02-19 RX ADMIN — TRAMADOL HYDROCHLORIDE 25 MILLIGRAM(S): 50 TABLET ORAL at 06:20

## 2022-02-19 NOTE — BRIEF OPERATIVE NOTE - NSICDXBRIEFPREOP_GEN_ALL_CORE_FT
PRE-OP DIAGNOSIS:  Open wound 19-Feb-2022 12:13:03 BACK Dontae Palmer  
PRE-OP DIAGNOSIS:  Abscess of back 17-Feb-2022 19:26:34  Reyes, Madeleine  
no radiation

## 2022-02-19 NOTE — BRIEF OPERATIVE NOTE - NSICDXBRIEFPOSTOP_GEN_ALL_CORE_FT
POST-OP DIAGNOSIS:  Abscess of back 17-Feb-2022 19:26:44  Reyes, Madeleine  
POST-OP DIAGNOSIS:  Open wound 19-Feb-2022 12:13:23 BACK Dontae Palmer

## 2022-02-19 NOTE — BRIEF OPERATIVE NOTE - SPECIMENS
Surgery Progress Note    Subjective  No acute events overnight. Reports pain and nausea. Denies fever.    Physical exam:  Temp:  [97.7 °F (36.5 °C)-99.3 °F (37.4 °C)] 99.3 °F (37.4 °C)  Heart Rate:  [55-87] 87  Resp:  [16-17] 16  BP: (101-130)/(70-96) 101/70    General: No acute distress, Resting comfortably in bed  HEENT: Normocephalic, Supple neck  CV: Regular Rate  Resp: Non-labored breathing, symmetric chest rise  Abdomen: Soft, tender to RUQ, no rebound, no distension  Neuro: Alert    Recent Labs     07/13/21  0434 07/13/21  0549 07/13/21  1558 07/13/21 2122 07/13/21 2129 07/14/21  0522   WBC 17.4*  --   --   --   --  15.5*   HGB 16.0*  --   --   --   --  14.2   HCT 49.8*  --   --   --   --  44.1     --   --   --   --  350   SODIUM  --  138  --   --   --  142   CHLORIDE  --  105  --   --   --  108*   POTASSIUM  --  4.3  --   --   --  4.0   CO2  --  29  --   --   --  27   BUN  --  9  --   --   --  11   CREATININE  --  0.76  --   --   --  0.88   GLUCOSE  --  100*  --   --   --  102*   CALCIUM  --  14.4* 15.4* 14.6*  --  11.3*   AST 83*  --   --   --   --   --    GPT 47  --   --   --   --   --    ALKPT 142*  --   --   --   --   --    BILIRUBIN 0.5  --   --   --   --   --    CPK  --   --   --   --  26  --        Assessment:   18 year old female with multiple liver hamartomas presents for right upper quadrant pain since 02/2021 worse today. Afebrile and hemodynamically stable. Leukocytosis of 17.4. Unlikely that liver hamartomas are cause of fatigue and weight loss. MRI abdomen suggests that appearance of lesions is not entirely characteristic of hamartomas and underlying neoplastic process should be considered.    Plan:  -Appreciate GI recs  -Consider repeat liver biopsy  -Follow up on HIDA scan  -Rest per primary    Discussed with Attending Dr. Chaparro Tony PGY 1    
soft tissue, back
NONE

## 2022-02-19 NOTE — BRIEF OPERATIVE NOTE - NSICDXBRIEFPROCEDURE_GEN_ALL_CORE_FT
PROCEDURES:  Initial intraoperative application of wound vacuum assisted closure (VAC) device 19-Feb-2022 12:12:35 BACK Dontae Palmer   PROCEDURES:  Initial intraoperative application of wound vacuum assisted closure (VAC) device 19-Feb-2022 12:12:35 WASH OUT OPEN BACK WOUND Dontae Palmer

## 2022-02-20 LAB
ANION GAP SERPL CALC-SCNC: 4 MMOL/L — LOW (ref 5–17)
BUN SERPL-MCNC: 16 MG/DL — SIGNIFICANT CHANGE UP (ref 7–23)
CALCIUM SERPL-MCNC: 8.4 MG/DL — LOW (ref 8.5–10.1)
CHLORIDE SERPL-SCNC: 109 MMOL/L — HIGH (ref 96–108)
CO2 SERPL-SCNC: 28 MMOL/L — SIGNIFICANT CHANGE UP (ref 22–31)
CREAT SERPL-MCNC: 0.69 MG/DL — SIGNIFICANT CHANGE UP (ref 0.5–1.3)
GLUCOSE SERPL-MCNC: 95 MG/DL — SIGNIFICANT CHANGE UP (ref 70–99)
HCT VFR BLD CALC: 28.6 % — LOW (ref 34.5–45)
HGB BLD-MCNC: 8.2 G/DL — LOW (ref 11.5–15.5)
MAGNESIUM SERPL-MCNC: 3.1 MG/DL — HIGH (ref 1.6–2.6)
MCHC RBC-ENTMCNC: 19.9 PG — LOW (ref 27–34)
MCHC RBC-ENTMCNC: 28.7 G/DL — LOW (ref 32–36)
MCV RBC AUTO: 69.2 FL — LOW (ref 80–100)
NRBC # BLD: 1 /100 WBCS — HIGH (ref 0–0)
PHOSPHATE SERPL-MCNC: 3.7 MG/DL — SIGNIFICANT CHANGE UP (ref 2.5–4.5)
PLATELET # BLD AUTO: 559 K/UL — HIGH (ref 150–400)
POTASSIUM SERPL-MCNC: 4.1 MMOL/L — SIGNIFICANT CHANGE UP (ref 3.5–5.3)
POTASSIUM SERPL-SCNC: 4.1 MMOL/L — SIGNIFICANT CHANGE UP (ref 3.5–5.3)
RBC # BLD: 4.13 M/UL — SIGNIFICANT CHANGE UP (ref 3.8–5.2)
RBC # FLD: 27.3 % — HIGH (ref 10.3–14.5)
SODIUM SERPL-SCNC: 141 MMOL/L — SIGNIFICANT CHANGE UP (ref 135–145)
WBC # BLD: 7.32 K/UL — SIGNIFICANT CHANGE UP (ref 3.8–10.5)
WBC # FLD AUTO: 7.32 K/UL — SIGNIFICANT CHANGE UP (ref 3.8–10.5)

## 2022-02-20 RX ORDER — MAGNESIUM SULFATE 500 MG/ML
1 VIAL (ML) INJECTION ONCE
Refills: 0 | Status: COMPLETED | OUTPATIENT
Start: 2022-02-20 | End: 2022-02-20

## 2022-02-20 RX ADMIN — Medication 250 MILLIGRAM(S): at 01:52

## 2022-02-20 RX ADMIN — Medication 250 MILLIGRAM(S): at 13:02

## 2022-02-20 RX ADMIN — PIPERACILLIN AND TAZOBACTAM 25 GRAM(S): 4; .5 INJECTION, POWDER, LYOPHILIZED, FOR SOLUTION INTRAVENOUS at 22:00

## 2022-02-20 RX ADMIN — PIPERACILLIN AND TAZOBACTAM 25 GRAM(S): 4; .5 INJECTION, POWDER, LYOPHILIZED, FOR SOLUTION INTRAVENOUS at 14:58

## 2022-02-20 RX ADMIN — TRAMADOL HYDROCHLORIDE 25 MILLIGRAM(S): 50 TABLET ORAL at 21:59

## 2022-02-20 RX ADMIN — Medication 100 GRAM(S): at 13:02

## 2022-02-20 RX ADMIN — HEPARIN SODIUM 5000 UNIT(S): 5000 INJECTION INTRAVENOUS; SUBCUTANEOUS at 07:01

## 2022-02-20 RX ADMIN — HEPARIN SODIUM 5000 UNIT(S): 5000 INJECTION INTRAVENOUS; SUBCUTANEOUS at 17:27

## 2022-02-21 LAB
ANION GAP SERPL CALC-SCNC: 5 MMOL/L — SIGNIFICANT CHANGE UP (ref 5–17)
ANISOCYTOSIS BLD QL: SLIGHT — SIGNIFICANT CHANGE UP
BASOPHILS # BLD AUTO: 0 K/UL — SIGNIFICANT CHANGE UP (ref 0–0.2)
BASOPHILS NFR BLD AUTO: 0 % — SIGNIFICANT CHANGE UP (ref 0–2)
BUN SERPL-MCNC: 14 MG/DL — SIGNIFICANT CHANGE UP (ref 7–23)
CALCIUM SERPL-MCNC: 8.3 MG/DL — LOW (ref 8.5–10.1)
CHLORIDE SERPL-SCNC: 109 MMOL/L — HIGH (ref 96–108)
CO2 SERPL-SCNC: 26 MMOL/L — SIGNIFICANT CHANGE UP (ref 22–31)
CREAT SERPL-MCNC: 0.73 MG/DL — SIGNIFICANT CHANGE UP (ref 0.5–1.3)
DACRYOCYTES BLD QL SMEAR: SLIGHT — SIGNIFICANT CHANGE UP
ELLIPTOCYTES BLD QL SMEAR: SLIGHT — SIGNIFICANT CHANGE UP
EOSINOPHIL # BLD AUTO: 0.17 K/UL — SIGNIFICANT CHANGE UP (ref 0–0.5)
EOSINOPHIL NFR BLD AUTO: 2 % — SIGNIFICANT CHANGE UP (ref 0–6)
GLUCOSE BLDC GLUCOMTR-MCNC: 117 MG/DL — HIGH (ref 70–99)
GLUCOSE SERPL-MCNC: 107 MG/DL — HIGH (ref 70–99)
HCT VFR BLD CALC: 28.5 % — LOW (ref 34.5–45)
HGB BLD-MCNC: 8.1 G/DL — LOW (ref 11.5–15.5)
HYPOCHROMIA BLD QL: SLIGHT — SIGNIFICANT CHANGE UP
LYMPHOCYTES # BLD AUTO: 3.02 K/UL — SIGNIFICANT CHANGE UP (ref 1–3.3)
LYMPHOCYTES # BLD AUTO: 36 % — SIGNIFICANT CHANGE UP (ref 13–44)
MACROCYTES BLD QL: SLIGHT — SIGNIFICANT CHANGE UP
MAGNESIUM SERPL-MCNC: 2.5 MG/DL — SIGNIFICANT CHANGE UP (ref 1.6–2.6)
MANUAL SMEAR VERIFICATION: SIGNIFICANT CHANGE UP
MCHC RBC-ENTMCNC: 20 PG — LOW (ref 27–34)
MCHC RBC-ENTMCNC: 28.4 G/DL — LOW (ref 32–36)
MCV RBC AUTO: 70.2 FL — LOW (ref 80–100)
MICROCYTES BLD QL: SIGNIFICANT CHANGE UP
MONOCYTES # BLD AUTO: 0.67 K/UL — SIGNIFICANT CHANGE UP (ref 0–0.9)
MONOCYTES NFR BLD AUTO: 8 % — SIGNIFICANT CHANGE UP (ref 2–14)
NEUTROPHILS # BLD AUTO: 4.53 K/UL — SIGNIFICANT CHANGE UP (ref 1.8–7.4)
NEUTROPHILS NFR BLD AUTO: 54 % — SIGNIFICANT CHANGE UP (ref 43–77)
NRBC # BLD: 0 /100 — SIGNIFICANT CHANGE UP (ref 0–0)
NRBC # BLD: SIGNIFICANT CHANGE UP /100 WBCS (ref 0–0)
PHOSPHATE SERPL-MCNC: 3.5 MG/DL — SIGNIFICANT CHANGE UP (ref 2.5–4.5)
PLAT MORPH BLD: NORMAL — SIGNIFICANT CHANGE UP
PLATELET # BLD AUTO: 534 K/UL — HIGH (ref 150–400)
POIKILOCYTOSIS BLD QL AUTO: SLIGHT — SIGNIFICANT CHANGE UP
POLYCHROMASIA BLD QL SMEAR: SLIGHT — SIGNIFICANT CHANGE UP
POTASSIUM SERPL-MCNC: 4.2 MMOL/L — SIGNIFICANT CHANGE UP (ref 3.5–5.3)
POTASSIUM SERPL-SCNC: 4.2 MMOL/L — SIGNIFICANT CHANGE UP (ref 3.5–5.3)
RBC # BLD: 4.06 M/UL — SIGNIFICANT CHANGE UP (ref 3.8–5.2)
RBC # FLD: 28.6 % — HIGH (ref 10.3–14.5)
RBC BLD AUTO: ABNORMAL
SODIUM SERPL-SCNC: 140 MMOL/L — SIGNIFICANT CHANGE UP (ref 135–145)
VANCOMYCIN TROUGH SERPL-MCNC: 12.2 UG/ML — SIGNIFICANT CHANGE UP (ref 10–20)
WBC # BLD: 8.39 K/UL — SIGNIFICANT CHANGE UP (ref 3.8–10.5)
WBC # FLD AUTO: 8.39 K/UL — SIGNIFICANT CHANGE UP (ref 3.8–10.5)

## 2022-02-21 PROCEDURE — 99222 1ST HOSP IP/OBS MODERATE 55: CPT

## 2022-02-21 RX ORDER — LOSARTAN POTASSIUM 100 MG/1
25 TABLET, FILM COATED ORAL DAILY
Refills: 0 | Status: DISCONTINUED | OUTPATIENT
Start: 2022-02-21 | End: 2022-02-24

## 2022-02-21 RX ORDER — LANOLIN ALCOHOL/MO/W.PET/CERES
6 CREAM (GRAM) TOPICAL AT BEDTIME
Refills: 0 | Status: DISCONTINUED | OUTPATIENT
Start: 2022-02-21 | End: 2022-02-24

## 2022-02-21 RX ADMIN — HEPARIN SODIUM 5000 UNIT(S): 5000 INJECTION INTRAVENOUS; SUBCUTANEOUS at 17:47

## 2022-02-21 RX ADMIN — Medication 6 MILLIGRAM(S): at 21:21

## 2022-02-21 RX ADMIN — TRAMADOL HYDROCHLORIDE 25 MILLIGRAM(S): 50 TABLET ORAL at 18:34

## 2022-02-21 RX ADMIN — HEPARIN SODIUM 5000 UNIT(S): 5000 INJECTION INTRAVENOUS; SUBCUTANEOUS at 05:08

## 2022-02-21 RX ADMIN — Medication 250 MILLIGRAM(S): at 12:22

## 2022-02-21 RX ADMIN — PIPERACILLIN AND TAZOBACTAM 25 GRAM(S): 4; .5 INJECTION, POWDER, LYOPHILIZED, FOR SOLUTION INTRAVENOUS at 21:21

## 2022-02-21 RX ADMIN — PIPERACILLIN AND TAZOBACTAM 25 GRAM(S): 4; .5 INJECTION, POWDER, LYOPHILIZED, FOR SOLUTION INTRAVENOUS at 05:08

## 2022-02-21 RX ADMIN — Medication 250 MILLIGRAM(S): at 03:09

## 2022-02-21 RX ADMIN — PIPERACILLIN AND TAZOBACTAM 25 GRAM(S): 4; .5 INJECTION, POWDER, LYOPHILIZED, FOR SOLUTION INTRAVENOUS at 12:22

## 2022-02-21 NOTE — DISCHARGE NOTE PROVIDER - CARE PROVIDER_API CALL
Jeronimo Bryan  [address] [facility], [address] [place] [address]  Phone: [phone]  Fax: [phone]  Follow Up Time: 1 week   Jeroniom Bryan  [address] [facility], [address] [place] [address]  Phone: [phone]  Fax: [phone]  Follow Up Time: 1 week    César Moore)  Plastic Surgery  [address] [place] [address]  Phone: [phone]  Fax: [phone]  Follow Up Time:

## 2022-02-21 NOTE — DISCHARGE NOTE PROVIDER - NSDCCPTREATMENT_GEN_ALL_CORE_FT
PRINCIPAL PROCEDURE  Procedure: Drainage of abscess of superficial soft tissue  Findings and Treatment: back

## 2022-02-21 NOTE — DISCHARGE NOTE PROVIDER - NSDCFUADDINST_GEN_ALL_CORE_FT
Wound Care; VNS.  Cleanse the wound with normal saline. Then apply santyl to the wound base and apply Cavillon to wound borders. Use Vac adhesive strips to window the wound. After that, apply white foam to the deep wound followed by black foam to bridge it at the dorsal foot. Then use VAC strips to secure the foam. Then attach the VAC tubing and set the VAC at 125mmHg at medium intensity on a continuous basis.      Wound Care    Taking care of your wound properly can help to prevent pain and infection. It can also help your wound to heal more quickly.     HOW TO CARE FOR YOUR WOUND   Take or apply over-the-counter and prescription medicines only as told by your health care provider.  If you were prescribed antibiotic medicine, take or apply it as told by your health care provider. Do not stop using the antibiotic even if your condition improves.  Clean the wound each day or as told by your health care provider.  Wash the wound with mild soap and water.  Rinse the wound with water to remove all soap.  Pat the wound dry with a clean towel. Do not rub it.  There are many different ways to close and cover a wound. For example, a wound can be covered with stitches (sutures), skin glue, or adhesive strips. Follow instructions from your health care provider about:  How to take care of your wound.  When and how you should change your bandage (dressing).  When you should remove your dressing.  Removing whatever was used to close your wound.  Check your wound every day for signs of infection. Watch for:  Redness, swelling, or pain.  Fluid, blood, or pus.  Keep the dressing dry until your health care provider says it can be removed. Do not take baths, swim, use a hot tub, or do anything that would put your wound underwater until your health care provider approves.  Raise (elevate) the injured area above the level of your heart while you are sitting or lying down.  Do not scratch or pick at the wound.  Keep all follow-up visits as told by your health care provider. This is important.    SEEK MEDICAL CARE IF:  You received a tetanus shot and you have swelling, severe pain, redness, or bleeding at the injection site.  You have a fever.  Your pain is not controlled with medicine.  You have increased redness, swelling, or pain at the site of your wound.  You have fluid, blood, or pus coming from your wound.  You notice a bad smell coming from your wound or your dressing.    SEEK IMMEDIATE MEDICAL CARE IF:  You have a red streak going away from your wound.

## 2022-02-21 NOTE — DISCHARGE NOTE PROVIDER - NSDCFUADDAPPT_GEN_ALL_CORE_FT
Follow up with Dr. Bryan in 1-2 weeks. Please call to schedule an appointment.   NOTIFY YOUR SURGEON IF: You have any bleeding that does not stop, any pus draining from your wound, any fever (over 100.4 F) or chills, persistent nausea/vomiting, persistent diarrhea, or if your pain is not controlled on your discharge pain medications.    Wound: You may take off outer pink dressing and shower after 48 hours. After showering, pat steri strips dry. Leave the white steri strips in place, they will fall off on their own in approximately 5-7 days or they will be removed at your follow up appointment.   Follow up with Dr. Bryan in 1-2 weeks. Please call to schedule an appointment.   NOTIFY YOUR SURGEON IF: You have any bleeding that does not stop, any pus draining from your wound, any fever (over 100.4 F) or chills, persistent nausea/vomiting, persistent diarrhea, or if your pain is not controlled on your discharge pain medications.     Follow up with Dr. Bryan in 1-2 weeks. Please call to schedule an appointment.   NOTIFY YOUR SURGEON IF: You have any bleeding that does not stop, any pus draining from your wound, any fever (over 100.4 F) or chills, persistent nausea/vomiting, persistent diarrhea, or if your pain is not controlled on your discharge pain medications.     Please follow up with Dr. Moore, plastic surgeon, in 1-2 weeks. You may call the office to schedule an appointment.

## 2022-02-21 NOTE — DISCHARGE NOTE PROVIDER - NSDCMRMEDTOKEN_GEN_ALL_CORE_FT
Augmentin 875 mg-125 mg oral tablet: 1 tab(s) orally every 12 hours  buPROPion HCL SR  150 M tab(s) orally once a day  hydroCHLOROthiazide 25 mg oral tablet: 1 tab(s) orally once a day  losartan 50 mg oral tablet: 1 tab(s) orally once a day  simvastatin 10 mg oral tablet: 1 tab(s) orally once a day (at bedtime)  Vitamin D3 1000 intl units oral capsule: 1 cap(s) orally once a day   buPROPion HCL SR  150 M tab(s) orally once a day  hydroCHLOROthiazide 25 mg oral tablet: 1 tab(s) orally once a day  losartan 50 mg oral tablet: 1 tab(s) orally once a day  simvastatin 10 mg oral tablet: 1 tab(s) orally once a day (at bedtime)  Vitamin D3 1000 intl units oral capsule: 1 cap(s) orally once a day   Augmentin 875 mg-125 mg oral tablet: 1 tab(s) orally every 12 hours MDD:2 tabs  buPROPion HCL SR  150 M tab(s) orally once a day  doxycycline hyclate 100 mg oral capsule: 1 cap(s) orally 2 times a day MDD:2 caps  hydroCHLOROthiazide 25 mg oral tablet: 1 tab(s) orally once a day  losartan 50 mg oral tablet: 1 tab(s) orally once a day  simvastatin 10 mg oral tablet: 1 tab(s) orally once a day (at bedtime)  Vitamin D3 1000 intl units oral capsule: 1 cap(s) orally once a day   amoxicillin-clavulanate 875 mg-125 mg oral tablet: 1 milligram(s) orally 2 times a day   buPROPion HCL SR  150 M tab(s) orally once a day  doxycycline hyclate 100 mg oral capsule: 1 cap(s) orally 2 times a day MDD:2 caps  hydroCHLOROthiazide 25 mg oral tablet: 1 tab(s) orally once a day  losartan 50 mg oral tablet: 1 tab(s) orally once a day  simvastatin 10 mg oral tablet: 1 tab(s) orally once a day (at bedtime)  Vitamin D3 1000 intl units oral capsule: 1 cap(s) orally once a day

## 2022-02-21 NOTE — DISCHARGE NOTE PROVIDER - PROVIDER TOKENS
FREE:[LAST:[Shterental],FIRST:[Jeronimo],PHONE:[phone]],FAX:[phone]],ADDRESS:[[address] [facility], [address] [place] [address]],FOLLOWUP:[1 week]] FREE:[LAST:[Shterental],FIRST:[Jeronimo],PHONE:[phone]],FAX:[phone]],ADDRESS:[[address] [place] [address]],FOLLOWUP:[1 week]],PROVIDER:[TOKEN:[[id number]]]

## 2022-02-21 NOTE — CONSULT NOTE ADULT - SUBJECTIVE AND OBJECTIVE BOX
MATTHIASBEN  MRN-[id number]        Patient is a 71y old  Female who presents with a chief complaint of back abscess (2022 06:03)      HPI:  Patient is a 71 year old female cigarette smoker with PMH HTN, HLD who presents to the ED c/o enlarging, painful abscess on her upper back. Patient states that for years she's suffered from a small cyst on her back that she treated with antibiotics and warm compresses and always improved. Last week, patient noticed a recurrence of her lump and she saw her dermatologist who prescribed Keflex and Mupirocin ointment. Patient states that over the last 4 days, the lump has grown in size tremendously and became much more pain. She also c/o chills.  Denies fever, chest pain, sob, cough, recent travel, sick contacts.   (2022 15:15)    admitted for nec fasc of the back s/p debridement all cultures negative, has responded to antibiotics and doing well on the wound vac, nearing discharge  ID consulted for workup and antibiotic management     PAST MEDICAL & SURGICAL HISTORY:  HTN (hypertension)    Hyperlipidemia    Obesity (BMI 30.0-34.9)    Female stress incontinence  Had Bladder Sling Procedure (  )        Allergies  No Known Allergies        ANTIMICROBIALS:  piperacillin/tazobactam IVPB.. 3.375 every 8 hours  vancomycin  IVPB 1000 every 12 hours      MEDICATIONS  (STANDING):  piperacillin/tazobactam IVPB..   25 mL/Hr IV Intermittent (22 @ 12:22)   25 mL/Hr IV Intermittent (22 @ 05:08)   25 mL/Hr IV Intermittent (22 @ 22:00)   25 mL/Hr IV Intermittent (22 @ 14:58)   25 mL/Hr IV Intermittent (22 @ 22:17)    piperacillin/tazobactam IVPB..   25 mL/Hr IV Intermittent (22 @ 06:34)   25 mL/Hr IV Intermittent (22 @ 21:22)   25 mL/Hr IV Intermittent (22 @ 11:36)   25 mL/Hr IV Intermittent (22 @ 05:09)   25 mL/Hr IV Intermittent (22 @ 21:50)    piperacillin/tazobactam IVPB...   200 mL/Hr IV Intermittent (22 @ 13:09)    vancomycin  IVPB   250 mL/Hr IV Intermittent (22 @ 12:22)   250 mL/Hr IV Intermittent (22 @ 03:09)   250 mL/Hr IV Intermittent (22 @ 13:02)   250 mL/Hr IV Intermittent (22 @ 01:52)   250 mL/Hr IV Intermittent (22 @ 18:46)    vancomycin  IVPB   250 mL/Hr IV Intermittent (22 @ 01:27)   250 mL/Hr IV Intermittent (22 @ 11:37)   250 mL/Hr IV Intermittent (22 @ 01:01)    vancomycin  IVPB.   250 mL/Hr IV Intermittent (22 @ 14:01)        OTHER MEDS: MEDICATIONS  (STANDING):  aluminum hydroxide/magnesium hydroxide/simethicone Suspension 30 every 6 hours PRN  heparin   Injectable 5000 every 12 hours  HYDROmorphone  Injectable 0.5 every 6 hours PRN  losartan 25 daily  melatonin 6 at bedtime  ondansetron Injectable 4 once PRN  traMADol 25 every 6 hours PRN      SOCIAL HISTORY:     denies toxic habits   FAMILY HISTORY:  Mother  at age 89 from covid         REVIEW OF SYSTEMS  [  ] ROS unobtainable because:    [ X ] All other systems negative except as noted below:	    Constitutional:  [ ] fever [ ] chills  [ ] weight loss  [ ] weakness  Skin:  [X ] rash [ ] phlebitis	  Eyes: [ ] icterus [ ] pain  [ ] discharge	  ENMT: [ ] sore throat  [ ] thrush [ ] ulcers [ ] exudates  Respiratory: [ ] dyspnea [ ] hemoptysis [ ] cough [ ] sputum	  Cardiovascular:  [ ] chest pain [ ] palpitations [ ] edema	  Gastrointestinal:  [ ] nausea [ ] vomiting [ ] diarrhea [ ] constipation [ ] pain	  Genitourinary:  [ ] dysuria [ ] frequency [ ] hematuria [ ] discharge [ ] flank pain  [ ] incontinence  Musculoskeletal:  [ ] myalgias [ ] arthralgias [ ] arthritis  [ ] back pain  Neurological:  [ ] headache [ ] seizures  [ ] confusion/altered mental status  Psychiatric:  [ ] anxiety [ ] depression	  Hematology/Lymphatics:  [ ] lymphadenopathy  Endocrine:  [ ] adrenal [ ] thyroid  Allergic/Immunologic:	 [ ] transplant [ ] seasonal    Vital Signs Last 24 Hrs  T(F): 99.1 (22 @ 16:39), Max: 99.7 (22 @ 16:51)    Vital Signs Last 24 Hrs  HR: 58 (22 @ 16:39) (58 - 65)  BP: 151/70 (22 @ 16:39) (124/68 - 151/70)  RR: 18 (22 @ 16:39)  SpO2: 98% (22 @ 16:39) (95% - 98%)  Wt(kg): --    PHYSICAL EXAM:  Constitutional: non-toxic, no distress  HEAD/EYES: anicteric, no conjunctival injection  ENT:  supple, no thrush  Cardiovascular:   normal S1, S2, no murmur, no edema  Respiratory:  clear BS bilaterally, no wheezes, no rales  GI:  soft, non-tender, normal bowel sounds  :  no guy, no CVA tenderness  Musculoskeletal:  no synovitis, normal ROM  Neurologic: awake and alert, normal strength, no focal findings  Skin:  large deep wound vac present on the center of her back with no tenderness or surrounding erythema    Heme/Onc: no lymphadenopathy   Psychiatric:  awake, alert, appropriate mood          WBC Count: 8.39 K/uL ( @ 03:34)  WBC Count: 7.32 K/uL ( @ 07:18)  WBC Count: 9.87 K/uL ( @ 08:05)  WBC Count: 16.52 K/uL ( @ 15:20)  WBC Count: 19.65 K/uL ( @ 04:06)  WBC Count: 20.40 K/uL ( @ 19:34)  WBC Count: 23.51 K/uL ( @ 13:11)      Auto Neutrophil %: 54.0 % (22 @ 03:34)  Auto Neutrophil #: 4.53 K/uL (22 @ 03:34)  Auto Neutrophil %: 79.0 % *H* (22 @ 13:11)  Auto Neutrophil #: 18.57 K/uL *H* (22 @ 13:11)                            8.1    8.39  )-----------( 534      ( 2022 03:34 )             28.5           140  |  109<H>  |  14  ----------------------------<  107<H>  4.2   |  26  |  0.73    Ca    8.3<L>      2022 03:34  Phos  3.5       Mg     2.5             Creatinine Trend: 0.73<--, 0.69<--, 0.74<--, 0.76<--, 0.76<--, 0.86<--        MICROBIOLOGY:    Vancomycin Level, Trough: 12.2 ug/mL (22 @ 03:34)    C-Reactive Protein, Serum: 327 ()    SARS-CoV-2 Result: NotDetec (22 @ 13:13)      RADIOLOGY:  CT Chest w/ IV Cont (22 @ 13:48) >  IMPRESSION:  Subcutaneous abscess mid thoracic region as described. No pleural or lung   involvement.

## 2022-02-21 NOTE — DISCHARGE NOTE PROVIDER - NSDCACTIVITY_GEN_ALL_CORE
Return to Work/School allowed/Bathing allowed/Do not drive or operate machinery/Showering allowed/Do not make important decisions/Stairs allowed/Walking - Indoors allowed/No heavy lifting/straining/Walking - Outdoors allowed Return to Work/School allowed/Stairs allowed/Walking - Indoors allowed/No heavy lifting/straining/Walking - Outdoors allowed/Follow Instructions Provided by your Surgical Team

## 2022-02-21 NOTE — DISCHARGE NOTE PROVIDER - HOSPITAL COURSE
71 year old female with HTN, HLD, recurrent back cyst a/w sepsis due to large, necrotic back abscess, anemia, hypokalemia.  Pt was admitted for IV broad spectrum antibiotics and I&D.  2 days after I&D pt returned to OR for wound washout and VAC placement.  Pt also required PRBC transfusion and potassium repletion. 71 year old female with HTN, HLD, recurrent back cyst a/w sepsis due to large, necrotic back abscess, anemia, hypokalemia.  Pt was admitted for IV broad spectrum antibiotics and I&D.  At time of operation, she was found ot have necrosis of skin and subcutaneous tissue only. 2 days after I&D pt returned to OR for wound washout and VAC placement.  Pt also required PRBC transfusion and potassium repletion.

## 2022-02-21 NOTE — CONSULT NOTE ADULT - ASSESSMENT
71F admitted for nec fasc of the back   Presented with large necrotic tissue   CT showed Subcutaneous abscess mid thoracic region as described. No pleural or lung involvement  Blood cultures negative  wound cultures negative  has been on Vanco and zosyn for 5 days   doing well and nearing discharge    Plan:  -when ready for discharge, please change to Doxycycline 100mg 2 times a day and Augmentin 875mg 2 times a day for a total of 14 days from debridement   -wound vac per surgery   -if she hasnt had tetanus shot in last 10 years please administer TDAP on day of discharge    D/W surgical PA    Andrade Jain, DO  Infectious Disease Attending  Reachable via Microsoft Teams or ID office: [phone]  After 5pm/weekends please call [phone] for all inquiries and new consults

## 2022-02-21 NOTE — DISCHARGE NOTE PROVIDER - NSDCCPCAREPLAN_GEN_ALL_CORE_FT
PRINCIPAL DISCHARGE DIAGNOSIS  Diagnosis: Abscess of upper back excluding scapular region  Assessment and Plan of Treatment:       SECONDARY DISCHARGE DIAGNOSES  Diagnosis: Leukocytosis  Assessment and Plan of Treatment:

## 2022-02-22 ENCOUNTER — TRANSCRIPTION ENCOUNTER (OUTPATIENT)
Age: 72
End: 2022-02-22

## 2022-02-22 LAB
ALBUMIN SERPL ELPH-MCNC: 2 G/DL — LOW (ref 3.3–5)
ALP SERPL-CCNC: 131 U/L — HIGH (ref 40–120)
ALT FLD-CCNC: 25 U/L — SIGNIFICANT CHANGE UP (ref 12–78)
ANION GAP SERPL CALC-SCNC: 6 MMOL/L — SIGNIFICANT CHANGE UP (ref 5–17)
AST SERPL-CCNC: 22 U/L — SIGNIFICANT CHANGE UP (ref 15–37)
BILIRUB SERPL-MCNC: 0.3 MG/DL — SIGNIFICANT CHANGE UP (ref 0.2–1.2)
BUN SERPL-MCNC: 9 MG/DL — SIGNIFICANT CHANGE UP (ref 7–23)
CALCIUM SERPL-MCNC: 8.6 MG/DL — SIGNIFICANT CHANGE UP (ref 8.5–10.1)
CHLORIDE SERPL-SCNC: 109 MMOL/L — HIGH (ref 96–108)
CO2 SERPL-SCNC: 27 MMOL/L — SIGNIFICANT CHANGE UP (ref 22–31)
CREAT SERPL-MCNC: 0.74 MG/DL — SIGNIFICANT CHANGE UP (ref 0.5–1.3)
CULTURE RESULTS: SIGNIFICANT CHANGE UP
CULTURE RESULTS: SIGNIFICANT CHANGE UP
GLUCOSE SERPL-MCNC: 108 MG/DL — HIGH (ref 70–99)
HCT VFR BLD CALC: 30.2 % — LOW (ref 34.5–45)
HGB BLD-MCNC: 8.6 G/DL — LOW (ref 11.5–15.5)
MAGNESIUM SERPL-MCNC: 2.7 MG/DL — HIGH (ref 1.6–2.6)
MCHC RBC-ENTMCNC: 20.1 PG — LOW (ref 27–34)
MCHC RBC-ENTMCNC: 28.5 G/DL — LOW (ref 32–36)
MCV RBC AUTO: 70.6 FL — LOW (ref 80–100)
NRBC # BLD: 0 /100 WBCS — SIGNIFICANT CHANGE UP (ref 0–0)
PHOSPHATE SERPL-MCNC: 3.8 MG/DL — SIGNIFICANT CHANGE UP (ref 2.5–4.5)
PLATELET # BLD AUTO: 550 K/UL — HIGH (ref 150–400)
POTASSIUM SERPL-MCNC: 4.5 MMOL/L — SIGNIFICANT CHANGE UP (ref 3.5–5.3)
POTASSIUM SERPL-SCNC: 4.5 MMOL/L — SIGNIFICANT CHANGE UP (ref 3.5–5.3)
PROT SERPL-MCNC: 6 GM/DL — SIGNIFICANT CHANGE UP (ref 6–8.3)
RBC # BLD: 4.28 M/UL — SIGNIFICANT CHANGE UP (ref 3.8–5.2)
RBC # FLD: 29.3 % — HIGH (ref 10.3–14.5)
SODIUM SERPL-SCNC: 142 MMOL/L — SIGNIFICANT CHANGE UP (ref 135–145)
SPECIMEN SOURCE: SIGNIFICANT CHANGE UP
SPECIMEN SOURCE: SIGNIFICANT CHANGE UP
SURGICAL PATHOLOGY STUDY: SIGNIFICANT CHANGE UP
WBC # BLD: 8.78 K/UL — SIGNIFICANT CHANGE UP (ref 3.8–10.5)
WBC # FLD AUTO: 8.78 K/UL — SIGNIFICANT CHANGE UP (ref 3.8–10.5)

## 2022-02-22 PROCEDURE — 99232 SBSQ HOSP IP/OBS MODERATE 35: CPT

## 2022-02-22 RX ADMIN — Medication 6 MILLIGRAM(S): at 21:55

## 2022-02-22 RX ADMIN — Medication 250 MILLIGRAM(S): at 14:35

## 2022-02-22 RX ADMIN — TRAMADOL HYDROCHLORIDE 25 MILLIGRAM(S): 50 TABLET ORAL at 16:33

## 2022-02-22 RX ADMIN — Medication 250 MILLIGRAM(S): at 01:34

## 2022-02-22 RX ADMIN — LOSARTAN POTASSIUM 25 MILLIGRAM(S): 100 TABLET, FILM COATED ORAL at 05:50

## 2022-02-22 RX ADMIN — TRAMADOL HYDROCHLORIDE 25 MILLIGRAM(S): 50 TABLET ORAL at 17:33

## 2022-02-22 RX ADMIN — PIPERACILLIN AND TAZOBACTAM 25 GRAM(S): 4; .5 INJECTION, POWDER, LYOPHILIZED, FOR SOLUTION INTRAVENOUS at 16:26

## 2022-02-22 RX ADMIN — HYDROMORPHONE HYDROCHLORIDE 0.5 MILLIGRAM(S): 2 INJECTION INTRAMUSCULAR; INTRAVENOUS; SUBCUTANEOUS at 17:31

## 2022-02-22 RX ADMIN — HYDROMORPHONE HYDROCHLORIDE 0.5 MILLIGRAM(S): 2 INJECTION INTRAMUSCULAR; INTRAVENOUS; SUBCUTANEOUS at 17:16

## 2022-02-22 RX ADMIN — HEPARIN SODIUM 5000 UNIT(S): 5000 INJECTION INTRAVENOUS; SUBCUTANEOUS at 17:14

## 2022-02-22 RX ADMIN — HEPARIN SODIUM 5000 UNIT(S): 5000 INJECTION INTRAVENOUS; SUBCUTANEOUS at 05:50

## 2022-02-22 RX ADMIN — PIPERACILLIN AND TAZOBACTAM 25 GRAM(S): 4; .5 INJECTION, POWDER, LYOPHILIZED, FOR SOLUTION INTRAVENOUS at 05:50

## 2022-02-22 NOTE — PROGRESS NOTE ADULT - ASSESSMENT
A/P: 71F had sepsis due to large, necrotic back abscess POD#5 s/p I&D,  POD#3 s/p wound wash out and VAC placement.  H/h stable   C/W IV Abx, F/U ID consult  f/u am labs, replete electrolyte prn,  pain management  Cont Vac to back wound  increase activity with PT  VTE ppx heparin  d/c planning with wound vac  will discuss with Attending
71F admitted for nec fasc of the back   Presented with large necrotic tissue   CT showed Subcutaneous abscess mid thoracic region as described. No pleural or lung involvement  Blood cultures negative  wound cultures negative  has been on Vanco and zosyn for 5 days   doing well and nearing discharge    2/22: no fevers, on RA, no WBC, Cr and LFTs ok, BC and surgical cultures with no growth to date, vac dressing with good seal, surrounding area is mildly tender to touch and there is mild erythema present, IV abx continued, vancomycin trough is therapeutic. Pt's loose bowel movements could be related to abx, abd exam is benign, will monitor.     Plan:  -when ready for discharge, please change to Doxycycline 100mg 2 times a day and Augmentin 875mg 2 times a day for a total of 14 days from debridement   -wound vac per surgery   -if she hasnt had tetanus shot in last 10 years please administer TDAP on day of discharge    Discussed with the pt
Assessment: 71F a/w sepsis due to large, necrotic back abscess S/P I&D of back abscess POD#2. 2uPRBC 2/17.    Plan:  - RTOR planning Saturday for washout possible further debridement and VAC placement  - local wound care  - DVT prophylaxis, Incentive Spirometer, OOB, Ambulating, pain control  - Continue antibiotics   - Continue telemetry  - f/u labs

## 2022-02-23 LAB
ALBUMIN SERPL ELPH-MCNC: 1.9 G/DL — LOW (ref 3.3–5)
ALP SERPL-CCNC: 115 U/L — SIGNIFICANT CHANGE UP (ref 40–120)
ALT FLD-CCNC: 25 U/L — SIGNIFICANT CHANGE UP (ref 12–78)
ANION GAP SERPL CALC-SCNC: 5 MMOL/L — SIGNIFICANT CHANGE UP (ref 5–17)
AST SERPL-CCNC: 21 U/L — SIGNIFICANT CHANGE UP (ref 15–37)
BILIRUB SERPL-MCNC: 0.3 MG/DL — SIGNIFICANT CHANGE UP (ref 0.2–1.2)
BUN SERPL-MCNC: 9 MG/DL — SIGNIFICANT CHANGE UP (ref 7–23)
CALCIUM SERPL-MCNC: 8.4 MG/DL — LOW (ref 8.5–10.1)
CHLORIDE SERPL-SCNC: 108 MMOL/L — SIGNIFICANT CHANGE UP (ref 96–108)
CO2 SERPL-SCNC: 25 MMOL/L — SIGNIFICANT CHANGE UP (ref 22–31)
CREAT SERPL-MCNC: 0.74 MG/DL — SIGNIFICANT CHANGE UP (ref 0.5–1.3)
GLUCOSE SERPL-MCNC: 92 MG/DL — SIGNIFICANT CHANGE UP (ref 70–99)
HCT VFR BLD CALC: 28.5 % — LOW (ref 34.5–45)
HGB BLD-MCNC: 8 G/DL — LOW (ref 11.5–15.5)
MAGNESIUM SERPL-MCNC: 2.4 MG/DL — SIGNIFICANT CHANGE UP (ref 1.6–2.6)
MCHC RBC-ENTMCNC: 20.1 PG — LOW (ref 27–34)
MCHC RBC-ENTMCNC: 28.1 G/DL — LOW (ref 32–36)
MCV RBC AUTO: 71.4 FL — LOW (ref 80–100)
NRBC # BLD: 0 /100 WBCS — SIGNIFICANT CHANGE UP (ref 0–0)
PHOSPHATE SERPL-MCNC: 3.2 MG/DL — SIGNIFICANT CHANGE UP (ref 2.5–4.5)
PLATELET # BLD AUTO: 533 K/UL — HIGH (ref 150–400)
POTASSIUM SERPL-MCNC: 4.2 MMOL/L — SIGNIFICANT CHANGE UP (ref 3.5–5.3)
POTASSIUM SERPL-SCNC: 4.2 MMOL/L — SIGNIFICANT CHANGE UP (ref 3.5–5.3)
PROT SERPL-MCNC: 5.7 GM/DL — LOW (ref 6–8.3)
RBC # BLD: 3.99 M/UL — SIGNIFICANT CHANGE UP (ref 3.8–5.2)
RBC # FLD: 29.8 % — HIGH (ref 10.3–14.5)
SODIUM SERPL-SCNC: 138 MMOL/L — SIGNIFICANT CHANGE UP (ref 135–145)
VANCOMYCIN TROUGH SERPL-MCNC: 19.7 UG/ML — SIGNIFICANT CHANGE UP (ref 10–20)
WBC # BLD: 8.32 K/UL — SIGNIFICANT CHANGE UP (ref 3.8–10.5)
WBC # FLD AUTO: 8.32 K/UL — SIGNIFICANT CHANGE UP (ref 3.8–10.5)

## 2022-02-23 RX ORDER — HYDROCHLOROTHIAZIDE 25 MG
25 TABLET ORAL DAILY
Refills: 0 | Status: DISCONTINUED | OUTPATIENT
Start: 2022-02-23 | End: 2022-02-24

## 2022-02-23 RX ORDER — TETANUS TOXOID, REDUCED DIPHTHERIA TOXOID AND ACELLULAR PERTUSSIS VACCINE, ADSORBED 5; 2.5; 8; 8; 2.5 [IU]/.5ML; [IU]/.5ML; UG/.5ML; UG/.5ML; UG/.5ML
0.5 SUSPENSION INTRAMUSCULAR ONCE
Refills: 0 | Status: COMPLETED | OUTPATIENT
Start: 2022-02-23 | End: 2022-02-24

## 2022-02-23 RX ADMIN — TRAMADOL HYDROCHLORIDE 25 MILLIGRAM(S): 50 TABLET ORAL at 01:09

## 2022-02-23 RX ADMIN — Medication 250 MILLIGRAM(S): at 00:57

## 2022-02-23 RX ADMIN — TRAMADOL HYDROCHLORIDE 25 MILLIGRAM(S): 50 TABLET ORAL at 16:21

## 2022-02-23 RX ADMIN — Medication 250 MILLIGRAM(S): at 13:21

## 2022-02-23 RX ADMIN — LOSARTAN POTASSIUM 25 MILLIGRAM(S): 100 TABLET, FILM COATED ORAL at 05:36

## 2022-02-23 RX ADMIN — TRAMADOL HYDROCHLORIDE 25 MILLIGRAM(S): 50 TABLET ORAL at 01:30

## 2022-02-23 RX ADMIN — PIPERACILLIN AND TAZOBACTAM 25 GRAM(S): 4; .5 INJECTION, POWDER, LYOPHILIZED, FOR SOLUTION INTRAVENOUS at 23:43

## 2022-02-23 RX ADMIN — Medication 25 MILLIGRAM(S): at 23:42

## 2022-02-23 RX ADMIN — PIPERACILLIN AND TAZOBACTAM 25 GRAM(S): 4; .5 INJECTION, POWDER, LYOPHILIZED, FOR SOLUTION INTRAVENOUS at 17:18

## 2022-02-23 RX ADMIN — HEPARIN SODIUM 5000 UNIT(S): 5000 INJECTION INTRAVENOUS; SUBCUTANEOUS at 05:35

## 2022-02-23 RX ADMIN — Medication 6 MILLIGRAM(S): at 23:42

## 2022-02-23 RX ADMIN — PIPERACILLIN AND TAZOBACTAM 25 GRAM(S): 4; .5 INJECTION, POWDER, LYOPHILIZED, FOR SOLUTION INTRAVENOUS at 00:45

## 2022-02-23 RX ADMIN — PIPERACILLIN AND TAZOBACTAM 25 GRAM(S): 4; .5 INJECTION, POWDER, LYOPHILIZED, FOR SOLUTION INTRAVENOUS at 09:00

## 2022-02-24 ENCOUNTER — TRANSCRIPTION ENCOUNTER (OUTPATIENT)
Age: 72
End: 2022-02-24

## 2022-02-24 VITALS
RESPIRATION RATE: 18 BRPM | SYSTOLIC BLOOD PRESSURE: 127 MMHG | TEMPERATURE: 99 F | DIASTOLIC BLOOD PRESSURE: 66 MMHG | HEART RATE: 54 BPM | OXYGEN SATURATION: 99 %

## 2022-02-24 LAB
FLUAV AG NPH QL: SIGNIFICANT CHANGE UP
FLUBV AG NPH QL: SIGNIFICANT CHANGE UP
SARS-COV-2 RNA SPEC QL NAA+PROBE: SIGNIFICANT CHANGE UP

## 2022-02-24 RX ADMIN — Medication 25 MILLIGRAM(S): at 05:20

## 2022-02-24 RX ADMIN — PIPERACILLIN AND TAZOBACTAM 25 GRAM(S): 4; .5 INJECTION, POWDER, LYOPHILIZED, FOR SOLUTION INTRAVENOUS at 16:12

## 2022-02-24 RX ADMIN — HYDROMORPHONE HYDROCHLORIDE 0.5 MILLIGRAM(S): 2 INJECTION INTRAMUSCULAR; INTRAVENOUS; SUBCUTANEOUS at 11:02

## 2022-02-24 RX ADMIN — Medication 250 MILLIGRAM(S): at 01:43

## 2022-02-24 RX ADMIN — TETANUS TOXOID, REDUCED DIPHTHERIA TOXOID AND ACELLULAR PERTUSSIS VACCINE, ADSORBED 0.5 MILLILITER(S): 5; 2.5; 8; 8; 2.5 SUSPENSION INTRAMUSCULAR at 18:21

## 2022-02-24 RX ADMIN — PIPERACILLIN AND TAZOBACTAM 25 GRAM(S): 4; .5 INJECTION, POWDER, LYOPHILIZED, FOR SOLUTION INTRAVENOUS at 09:50

## 2022-02-24 RX ADMIN — Medication 250 MILLIGRAM(S): at 12:38

## 2022-02-24 RX ADMIN — LOSARTAN POTASSIUM 25 MILLIGRAM(S): 100 TABLET, FILM COATED ORAL at 05:20

## 2022-02-24 RX ADMIN — HYDROMORPHONE HYDROCHLORIDE 0.5 MILLIGRAM(S): 2 INJECTION INTRAMUSCULAR; INTRAVENOUS; SUBCUTANEOUS at 10:47

## 2022-02-24 NOTE — DISCHARGE NOTE NURSING/CASE MANAGEMENT/SOCIAL WORK - NSDCFUADDAPPT_GEN_ALL_CORE_FT
Follow up with Dr. Bryan in 1-2 weeks. Please call to schedule an appointment.   NOTIFY YOUR SURGEON IF: You have any bleeding that does not stop, any pus draining from your wound, any fever (over 100.4 F) or chills, persistent nausea/vomiting, persistent diarrhea, or if your pain is not controlled on your discharge pain medications.     Please follow up with Dr. Moore, plastic surgeon, in 1-2 weeks. You may call the office to schedule an appointment.

## 2022-02-24 NOTE — PROGRESS NOTE ADULT - REASON FOR ADMISSION
back abscess

## 2022-02-24 NOTE — DISCHARGE NOTE NURSING/CASE MANAGEMENT/SOCIAL WORK - BRAND OF COVID-19 VACCINATION
Callum 44  Progress Note [facility] 1945, 67 y o  female MRN: [id number]  Unit/Bed#: [address] [place] [address] Encounter: [id number]  Primary Care Provider: No primary care provider on file  Date and time admitted to hospital: 4/9/2021 10:13 AM    * Closed displaced fracture of right femoral neck [facility])  Assessment & Plan  Imaging reveals closed displaced right subcapital femoral neck fracture  Presented after mechanical fall while getting out of her car   Appreciate ortho, POD# 3 Left PRISCILA 4/9  Pain control  PT/OT rec post acute rehab   Weight-bearing as tolerated  DVT ppx   Monitor H&H, hgb 12 --> 9's    COVID-19 virus infection  Assessment & Plan  Incidentally positive on screening in ER prior to OR   Patient denies cough, SOB currently  Patient is on O2 3L post op, satting high 90s  Cardiac markers:  Troponin normal   Check CK, proBNP  Trend inflammatory markers:  CRP trending up, D-dimer trending down, ferritin normal   Procalcitonin x2 negative  Continue vitamin-C, vitamin-D, zinc, followed by MVI  Start dexamethasone and remdesivir per protocol  Check chest x-ray stat  Incentive spirometer  Wean O2 as tolerated  CBC, CMP in a m  Gemma Blend Results from last 7 days   Lab Units 04/09/21  1137   SARS-COV-2  Positive*     Results from last 7 days   Lab Units 04/12/21  0632 04/11/21  0528 04/10/21  0513   CRP mg/L 106 6* 64 2* 12 8*   FERRITIN ng/mL 138  --  118   D-DIMER QUANTITATIVE ug/ml FEU  --  2 29* 11 96*      Results from last 7 days   Lab Units 04/11/21  0528 04/10/21  0513   PROCALCITONIN ng/ml 0 13 <0 05          Tachycardia  Assessment & Plan  Tachycardic HR in low 100s since yesterday    Patient asx  NSR on admission  Telemetry  Optimize electrolytes    Tobacco abuse  Assessment & Plan  Smoked 1/2ppd x 50 years (at least)   NRT   Encouraged cessation         VTE Pharmacologic Prophylaxis:   Pharmacologic: Enoxaparin (Lovenox)  Mechanical VTE Prophylaxis in Place: Yes    Patient Centered Rounds: I have performed bedside rounds with nursing staff today  Discussions with Specialists or Other Care Team Provider: attending, orthopedic surgery    Education and Discussions with Family / Patient:  Yes    Time Spent for Care: 20 minutes  More than 50% of total time spent on counseling and coordination of care as described above  Current Length of Stay: 3 day(s)    Current Patient Status: Inpatient   Certification Statement: The patient will continue to require additional inpatient hospital stay due to COVID-19 infection, tachycardia    Discharge Plan:  Pending progress    Code Status: Level 1 - Full Code      Subjective:   Patient denies any pain  Denies SOB, cough  Denies chest pain, headache, dizziness, nausea vomiting diarrhea  Reports no BM since admission  Reports feeling cold all the time  Objective:     Vitals:   Temp (24hrs), Av 5 °F (36 4 °C), Min:97 °F (36 1 °C), Max:98 °F (36 7 °C)    Temp:  [97 °F (36 1 °C)-98 °F (36 7 °C)] 97 6 °F (36 4 °C)  HR:  [] 105  Resp:  [18-20] 20  BP: (118-143)/(54-74) 118/71  SpO2:  [97 %-99 %] 97 %  Body mass index is 18 66 kg/m²  Input and Output Summary (last 24 hours): Intake/Output Summary (Last 24 hours) at 2021 1756  Last data filed at 2021 0655  Gross per 24 hour   Intake --   Output 200 ml   Net -200 ml       Physical Exam:     Physical Exam  Vitals signs and nursing note reviewed  Constitutional:       Appearance: She is well-developed  HENT:      Head: Normocephalic and atraumatic  Neck:      Musculoskeletal: Neck supple  Thyroid: No thyromegaly  Vascular: No JVD  Trachea: No tracheal deviation  Cardiovascular:      Rate and Rhythm: Regular rhythm  Tachycardia present  Heart sounds: Normal heart sounds  Pulmonary:      Effort: Pulmonary effort is normal  No respiratory distress  Breath sounds: No wheezing or rales        Comments: Diminished breath sound bilateral, on O2 3 L, satting high 90s  Abdominal:      General: Bowel sounds are normal  There is no distension  Palpations: Abdomen is soft  Tenderness: There is no abdominal tenderness  There is no guarding  Musculoskeletal:         General: Swelling and deformity present  Comments: Status post right hip surgery  Slight slight swelling   Skin:     General: Skin is warm and dry  Neurological:      General: No focal deficit present  Mental Status: She is alert and oriented to person, place, and time  Psychiatric:         Mood and Affect: Mood normal          Judgment: Judgment normal          Additional Data:     Labs:    Results from last 7 days   Lab Units 04/12/21  0632  04/09/21  1137   WBC Thousand/uL 6 50   < > 4 28*   HEMOGLOBIN g/dL 9 1*   < > 12 8   HEMATOCRIT % 28 1*   < > 38 9   PLATELETS Thousands/uL 137*   < > 204   NEUTROS PCT %  --   --  70   LYMPHS PCT %  --   --  16   MONOS PCT %  --   --  12   EOS PCT %  --   --  0    < > = values in this interval not displayed  Results from last 7 days   Lab Units 04/12/21  0632   POTASSIUM mmol/L 4 0   CHLORIDE mmol/L 103   CO2 mmol/L 31   BUN mg/dL 14   CREATININE mg/dL 0 61   CALCIUM mg/dL 7 6*     Results from last 7 days   Lab Units 04/09/21  1137   INR  1 10       * I Have Reviewed All Lab Data Listed Above  * Additional Pertinent Lab Tests Reviewed:  Steven 65 Admission Reviewed    Imaging:    Imaging Reports Reviewed Today Include:  Chest x-ray  Imaging Personally Reviewed by Myself Includes:  None    Recent Cultures (last 7 days):           Last 24 Hours Medication List:   Current Facility-Administered Medications   Medication Dose Route Frequency Provider Last Rate    acetaminophen  975 mg Oral Q8H [facility] Fadi Palomino PA-C      aluminum-magnesium hydroxide-simethicone  30 mL Oral Q6H PRN Rai Ruiz PA-C      ascorbic acid  1,000 mg Oral Q12H [facility] Rai Ruiz PA-C      calcium carbonate 1,000 mg Oral Daily PRN ARTUR Carrero-C      cholecalciferol  2,000 Units Oral Daily [place], [place]      dexamethasone  6 mg Intravenous Daily BOBBY Samaniego      enoxaparin  30 mg Subcutaneous Q12H [address] [place]      gabapentin  200 mg Oral BID [place], [place]      HYDROmorphone  0 2 mg Intravenous Q4H PRN Amber Winter PA-C      methocarbamol  250 mg Oral Q8H PRN Toangel Lundberg PA-C      zinc sulfate  220 mg Oral Daily RACHEL CarreroC      Followed by   Tiana Flores ON 4/17/2021] multivitamin-minerals  1 tablet Oral Daily Amber Winter PA-C      nicotine  1 patch Transdermal Daily [place], [place]      ondansetron  4 mg Intravenous Q6H PRN Amber Winter PA-C      oxyCODONE  2 5 mg Oral Q4H PRN ARTUR Carrero-NANCY      oxyCODONE  5 mg Oral Q4H PRN Amber Maggy, PA-NANCY      pantoprazole  40 mg Oral Early Morning ARTUR Carrero-NANCY      polyethylene glycol  17 g Oral Daily PRN Amber Winter PA-C      remdesivir  200 mg Intravenous Q24H BOBBY Samaniego      Followed by   Tiana Flores ON 4/13/2021] remdesivir  100 mg Intravenous Q24H BOBBY Samaniego      senna  1 tablet Oral Daily Amber Winter PA-C          Today, Patient Was Seen By: BOBBY Navas    ** Please Note: Dragon 360 Dictation voice to text software may have been used in the creation of this document   ** Moderna dose 1, 2, and 3

## 2022-02-24 NOTE — PROGRESS NOTE ADULT - SUBJECTIVE AND OBJECTIVE BOX
Patient seen and examined at bedside resting comfortably, no acute issues overnight. No bradycardia on monitor for > 24hrs.   No complaints offered. Wound vac in place.  Denies nausea, vomiting. Tolerating diet.  Denies chest pain, dyspnea, cough.    Vital Signs Last 24 Hrs  T(C): 36.8 (21 Feb 2022 05:07), Max: 37 (20 Feb 2022 11:46)  T(F): 98.3 (21 Feb 2022 05:07), Max: 98.6 (20 Feb 2022 11:46)  HR: 64 (21 Feb 2022 05:07) (62 - 67)  BP: 133/70 (21 Feb 2022 05:07) (124/68 - 151/72)  BP(mean): --  RR: 18 (21 Feb 2022 05:07) (16 - 18)  SpO2: 98% (21 Feb 2022 05:07) (95% - 98%)  I&O's Summary    19 Feb 2022 07:01  -  20 Feb 2022 07:00  --------------------------------------------------------  IN: 0 mL / OUT: 550 mL / NET: -550 mL    20 Feb 2022 07:01  -  21 Feb 2022 06:36  --------------------------------------------------------  IN: 177 mL / OUT: 0 mL / NET: 177 mL        PHYSICAL EXAM:  General: NAD  Neuro:  Alert & oriented x 3  HEENT: NCAT, EOMI, conjunctiva clear  CV: +S1+S2   Lung:  respirations nonlabored  Abdomen: soft, NTND  Extremities: no pedal edema or calf tenderness noted   Back: VAC with good seal, mild erythema of skin edges    LABS: Pending    Culture Results:   No growth (02.18.22 @ 01:08)    A/P: 71F a/w sepsis due to large, necrotic back abscess POD#4 s/p I&D,  POD#2 s/p wound wash out and VAC placement.  H/h stable   C/W IV Abx, F/U ID consult  f/u am labs, replete electrolyte prn,  pain management  d/c tele  Cont Vac to back wound  increase activity with PT  VTE ppx heparin  d/c planning with wound vac  will discuss with Attending
SURGERY PROGRESS HPI:  Pt seen and examined at bedside. Pain is well controlled on pain medication, says she slept well last night. Pt denies complaints. No acute events overnight. Pt denies chest pain, SOB, dizziness, fever, chills. Ambulating.      Vital Signs Last 24 Hrs  T(C): 36.6 (22 Feb 2022 11:06), Max: 37.3 (21 Feb 2022 16:39)  T(F): 97.9 (22 Feb 2022 11:06), Max: 99.1 (21 Feb 2022 16:39)  HR: 53 (22 Feb 2022 11:06) (53 - 60)  BP: 144/73 (22 Feb 2022 11:06) (121/66 - 151/70)  BP(mean): --  RR: 18 (22 Feb 2022 11:06) (18 - 18)  SpO2: 97% (22 Feb 2022 11:06) (93% - 98%)      PHYSICAL EXAM:    GENERAL: NAD  HEAD:  Atraumatic, Normocephalic  CHEST/LUNG: Clear to ausculation, bilaterally   HEART: RRR S1S2  BACK: VAC with good seal, mild erythema of skin edges  ABDOMEN: non distended, +BS, soft, non tender, no guarding  EXTREMITIES:  calf soft, non tender b/l    I&O's Detail    21 Feb 2022 07:01  -  22 Feb 2022 07:00  --------------------------------------------------------  IN:  Total IN: 0 mL    OUT:    Voided (mL): 700 mL  Total OUT: 700 mL    Total NET: -700 mL      22 Feb 2022 07:01  -  22 Feb 2022 13:38  --------------------------------------------------------  IN:  Total IN: 0 mL    OUT:    Voided (mL): 500 mL  Total OUT: 500 mL    Total NET: -500 mL          LABS:                        8.6    8.78  )-----------( 550      ( 22 Feb 2022 08:18 )             30.2     02-22    142  |  109<H>  |  9   ----------------------------<  108<H>  4.5   |  27  |  0.74    Ca    8.6      22 Feb 2022 08:18  Phos  3.8     02-22  Mg     2.7     02-22    TPro  6.0  /  Alb  2.0<L>  /  TBili  0.3  /  DBili  x   /  AST  22  /  ALT  25  /  AlkPhos  131<H>  02-22          type    RADIOLOGY & ADDITIONAL STUDIES:    Assessment:    Plan:  -pain management prn  -continue DVT prophylaxis, OOB, Ambulating  -continue incentive spirometer  -continue local wound care  -antibiotics   -f/u labs  -will discuss pt with surgical attending
Surgery NP note  Patient seen and examined bedside resting comfortably.  No complaints offered. Back pain is well controlled.  Denies nausea and vomiting. NPO  Normal flatus/BM.     T(F): 98.1 (02-19-22 @ 06:17), Max: 98.2 (02-18-22 @ 11:00)  HR: 72 (02-19-22 @ 06:17) (55 - 72)  BP: 136/81 (02-19-22 @ 06:17) (104/64 - 138/59)  RR: 18 (02-19-22 @ 06:17) (18 - 20)  SpO2: 97% (02-19-22 @ 06:17) (96% - 99%)  Wt(kg): --  CAPILLARY BLOOD GLUCOSE          PHYSICAL EXAM:  General: NAD, WDWN.   Neuro:  Alert & responsive  HEENT: NCAT, EOMI, conjunctiva clear  CV: +S1+S2 regular rate and rhythm  Lung: clear to ausculation bilaterally, respirations nonlabored, good inspiratory effort  Abdomen: soft, Non tender, No Distension. Normal active BS  Extremities: no pedal edema or calf tenderness noted   Back: Dressing to back wound CDI    LABS:              PENDING    17 Feb 2022 07:01  -  18 Feb 2022 07:00  --------------------------------------------------------  IN:    IV PiggyBack: 100 mL    Lactated Ringers Bolus: 1000 mL    PRBCs (Packed Red Blood Cells): 290 mL  Total IN: 1390 mL    OUT:    Voided (mL): 850 mL  Total OUT: 850 mL    Total NET: 540 mL      18 Feb 2022 07:01  -  19 Feb 2022 06:32  --------------------------------------------------------  IN:    IV PiggyBack: 100 mL    IV PiggyBack: 100 mL    IV PiggyBack: 250 mL    Lactated Ringers: 990 mL    PRBCs (Packed Red Blood Cells): 325 mL  Total IN: 1765 mL    OUT:    Voided (mL): 300 mL  Total OUT: 300 mL    Total NET: 1465 mL    
Postoperative Day #: 1  Patient seen and examined bedside resting comfortably.  Reports upper back discomfort is well controlled on present meds.   VAC intact.  No f/c, n/v. Tolerating diet.     T(F): 98.3 (02-20-22 @ 05:50), Max: 98.7 (02-19-22 @ 11:09)  HR: 52 (02-20-22 @ 05:50) (52 - 87)  BP: 145/60 (02-20-22 @ 05:50) (118/64 - 155/69)  RR: 18 (02-20-22 @ 05:50) (14 - 18)  SpO2: 97% (02-20-22 @ 05:50) (94% - 98%)  Wt(kg): --      PHYSICAL EXAM:  General: NAD  Neuro:  Alert & oriented x 3  HEENT: NCAT, EOMI, conjunctiva clear  CV: +S1+S2   Lung:  respirations nonlabored  Abdomen: soft, NTND  Extremities: no pedal edema or calf tenderness noted   Back: VAC with good seal, moderate erythema of skin edges    LABS:                        8.2    7.32  )-----------( 559      ( 20 Feb 2022 07:18 )             28.6     02-20    141  |  109<H>  |  16  ----------------------------<  95  4.1   |  28  |  0.69    Ca    8.4<L>      20 Feb 2022 07:18  Phos  3.7     02-20  Mg     3.1     02-20      2/18/22  Final Wound Culture Results:   No growth       A/P: 71F a/w sepsis due to large, necrotic back abscess POD#3 s/p I&D,  POD#1 s/p wound wash out and VAC placement.  H/h stable s/p 2uPRBC 2/17 and 1u 2/18.  Leukocytosis resolved, on zosyn/vanco. C/w IV Abx  Hypomagnesemia - repleted. F/u AM labs  continue local VAC changes  increase activity with PT  VTE ppx heparin
SURGERY PROGRESS HPI:  POD#1  Pt seen and examined at bedside. Pain is well controlled on pain medication. Pt denies complaints. Pt tolerating diet. Pt denies nausea and vomiting. Pt denies chest pain, SOB, dizziness, fever, chills.     Vital Signs Last 24 Hrs  T(C): 36.6 (18 Feb 2022 05:12), Max: 37.6 (17 Feb 2022 16:51)  T(F): 97.8 (18 Feb 2022 05:12), Max: 99.7 (17 Feb 2022 16:51)  HR: 58 (18 Feb 2022 05:12) (58 - 96)  BP: 115/58 (18 Feb 2022 05:12) (103/44 - 151/49)  BP(mean): 76 (17 Feb 2022 20:35) (65 - 83)  RR: 18 (18 Feb 2022 05:12) (12 - 21)  SpO2: 100% (18 Feb 2022 05:12) (95% - 100%)    PHYSICAL EXAM:  CONSTITUTIONAL: Alert, NAD  RESPIRATORY: Clear to auscultation bilaterally, respirations nonlabored  CARDIOVASCULAR: S1S2, Regular rate and rhythm  GASTROINTESTINAL: Soft, NTND  BACK: Dressing dry/intact with serous stain  : Martin indwelling with clear yellow urine  MUSCULOSKELETAL: no calf tenderness, No edema, intermittent compression devices in place bilaterally    I&O's Detail    17 Feb 2022 07:01  -  18 Feb 2022 06:01  --------------------------------------------------------  IN:    Lactated Ringers Bolus: 1000 mL    PRBCs (Packed Red Blood Cells): 290 mL  Total IN: 1290 mL    OUT:    Voided (mL): 850 mL  Total OUT: 850 mL    Total NET: 440 mL          LABS:                        7.5    19.65 )-----------( 547      ( 18 Feb 2022 04:06 )             25.9     02-18    139  |  107  |  13  ----------------------------<  136<H>  3.9   |  26  |  0.76    Ca    8.6      18 Feb 2022 04:06  Phos  3.3     02-18  Mg     3.1     02-18    TPro  7.3  /  Alb  2.2<L>  /  TBili  0.4  /  DBili  x   /  AST  17  /  ALT  20  /  AlkPhos  172<H>  02-17    PT/INR - ( 17 Feb 2022 13:11 )   PT: 16.7 sec;   INR: 1.47 ratio       PTT - ( 17 Feb 2022 13:11 )  PTT:30.3 sec        Assessment: 71F a/w sepsis due to large, necrotic back abscess S/P I&D of back abscess POD#1. 2uPRBC 2/17.    Plan:  - 1uPRBC ordered  - RTOR planning Saturday  - local wound care  - DVT prophylaxis, Incentive Spirometer, OOB, Ambulating, pain control  - Continue antibiotics   - Continue telemetry  - f/u labs 
Patient seen and examined at bedside in no distress.  No complaints offered. Awaiting discharge.    Vital Signs Last 24 Hrs  T(F): 97.6 (02-24-22 @ 04:23), Max: 99 (02-23-22 @ 11:17)  HR: 55 (02-24-22 @ 04:23)  BP: 129/68 (02-24-22 @ 04:23)  RR: 18 (02-24-22 @ 04:23)  SpO2: 96% (02-24-22 @ 04:23)    GENERAL: Alert, oriented, NAD  CHEST/LUNG: Respirations nonlabored  HEART: S1S2, RRR  BACK: Right upper back wound vac intact, functioning, no surrounding erythema/edema   EXTREMITIES: No pedal edema b/l     LABS:  No new labs    A/P: 71F POD7 s/p debridement of back abscess s/p RTOR for washout and vac placement  -- discharge home today once wound vac arrangements made  -- f/u with surgeon and with plastic surgeon, Dr. Moore   -- antibiotics rx as per ID recommendations  -- ambulate as tolerated, incentive spirometer, dvt ppx  -- discussed with Dr. Bryan     
Patient seen and examined at bedside in no distress.  No complaints offered. Denies pain.  Has been ambulating.  Denies fever, chills, chest pain, sob.    Vital Signs Last 24 Hrs  T(F): 98.7 (02-23-22 @ 05:00), Max: 98.7 (02-23-22 @ 05:00)  HR: 57 (02-23-22 @ 05:00)  BP: 136/72 (02-23-22 @ 05:00)  RR: 18 (02-23-22 @ 05:00)  SpO2: 97% (02-23-22 @ 05:00)    GENERAL: Alert, oriented, NAD  CHEST/LUNG: Respirations nonlabored  HEART: S1S2, RRR  BACK: Upper back wound vac in place, functioning well, no surrounding erythema/edema   EXTREMITIES: No pedal edema b/l     LABS:                        8.0    8.32  )-----------( 533      ( 23 Feb 2022 07:48 )             28.5     02-23    138  |  108  |  9   ----------------------------<  92  4.2   |  25  |  0.74    Ca    8.4<L>      23 Feb 2022 07:48  Phos  3.2     02-23  Mg     2.4     02-23    TPro  5.7<L>  /  Alb  1.9<L>  /  TBili  0.3  /  DBili  x   /  AST  21  /  ALT  25  /  AlkPhos  115  02-23    A/P: 71F POD6 s/p debridement of back abscess s/p RTOR for washout and vac placement  -- discharge home today once wound vac arrangements made  -- f/u with surgeon  -- antibiotics rx as per ID recommendations  -- ambulate as tolerated, incentive spirometer, dvt ppx  -- discussed with Dr. Mccallum 
Patient seen and examined at bedside resting comfortably, s/p wound washout and placement of VAC in OR  Offers no complaints, admits to localized tenderness when wound edges are pressed  Tolerating diet. Voiding on own.  Denies fever, chills, N/V/D, CP, SOB    Vital Signs Last 24 Hrs  T(F): 98.3 (02-19-22 @ 16:26), Max: 98.7 (02-19-22 @ 11:09)  HR: 62 (02-19-22 @ 16:26)  BP: 118/71 (02-19-22 @ 16:26)  RR: 17 (02-19-22 @ 16:26)  SpO2: 98% (02-19-22 @ 16:26)    PHYSICAL EXAM:  GENERAL: Alert, NAD  CHEST/LUNG: Clear to auscultation bilaterally, respirations nonlabored  HEART: Regular rate and rhythm; S1 & S2 appreciated  ABDOMEN: + Bowel sounds, soft, Nontender, Nondistended  SKIN: wound vac in place with adequate seal, wound edges ttp  EXTREMITIES:  no calf tenderness, No edema    I&O's Detail    18 Feb 2022 07:01  -  19 Feb 2022 07:00  --------------------------------------------------------  IN:    IV PiggyBack: 100 mL    IV PiggyBack: 100 mL    IV PiggyBack: 250 mL    Lactated Ringers: 990 mL    PRBCs (Packed Red Blood Cells): 325 mL  Total IN: 1765 mL    OUT:    Voided (mL): 300 mL  Total OUT: 300 mL    Total NET: 1465 mL      19 Feb 2022 07:01  -  19 Feb 2022 19:06  --------------------------------------------------------  IN:  Total IN: 0 mL    OUT:    VAC (Vacuum Assisted Closure) System (mL): 0 mL  Total OUT: 0 mL    Total NET: 0 mL    LABS:                        8.1    9.87  )-----------( 511      ( 19 Feb 2022 08:05 )             28.4     02-19    141  |  108  |  18  ----------------------------<  92  3.7   |  26  |  0.74    Ca    8.5      19 Feb 2022 08:05  Phos  3.4     02-19  Mg     3.1     02-19    A/P  71F a/w sepsis due to large, necrotic back abscess S/P I&D of back abscess POD#2. 2uPRBC 2/17.  now s/p wound wash out and placement of VAC on 2/19    - VAC management per PT  - IV abx  - analgesia prn  - DVT ppx, IS, OOB/AAT  - continue tele  - will d/w Dr. Bryan  
Post-op check    S/P I&D of back abscess POD#0  Pt seen and examined at bedside. Incisional pain well controlled with medication. Denies complaints. Tolerating water and juice at bedside. Denies chest pain, shortness of breath, nausea/ vomiting, and dizziness.     Vital Signs Last 24 Hrs  T(F): 98 (02-17-22 @ 20:40), Max: 99.7 (02-17-22 @ 16:51)  HR: 68 (02-17-22 @ 20:40)  BP: 116/71 (02-17-22 @ 20:40)  RR: 18 (02-17-22 @ 20:40)  SpO2: 98% (02-17-22 @ 20:40)    CONSTITUTIONAL: Alert, NAD  RESPIRATORY: Clear to auscultation bilaterally, respirations nonlabored  CARDIOVASCULAR: S1S2, Regular rate and rhythm  GASTROINTESTINAL: Soft, NTND  BACK: Dressing clean/dry/intact   : Martin indwelling with clear yellow urine  MUSCULOSKELETAL: no calf tenderness, No edema, intermittent compression devices in place bilaterally      Assessment: 71F a/w sepsis due to large, necrotic back abscess S/P I&D of back abscess POD#0. 2uPRBC 2/17.    Plan:  - RTOR planning Saturday  - local wound care  - DVT prophylaxis, Incentive Spirometer, OOB, Ambulating, pain control  - Continue antibiotics   - Continue telemetry  - f/u labs   
BEN SHUKLA  MRN-[id number]    Follow Up:  back infected wound, necrotizing fascitis     Interval History: The pt was seen and examined earlier, no distress, sitting on her bed, complains of having loose bowel movements earlier today x 3. The pt is afebrile, on RA, no WBC, Cr and LFTs ok.     PAST MEDICAL & SURGICAL HISTORY:  HTN (hypertension)    Hyperlipidemia    Obesity (BMI 30.0-34.9)    Female stress incontinence  Had Bladder Sling Procedure ( 2009 )        ROS:    [ ] Unobtainable because:  [x ] All other systems negative    Constitutional: no fever, no chills  Head: no trauma  Eyes: no vision changes, no eye pain  ENT:  no sore throat, no rhinorrhea  Cardiovascular:  no chest pain, no palpitation  Respiratory:  no SOB, no cough  GI:  no abd pain, no vomiting, 3 loose bowel movements   urinary: no dysuria, no hematuria, no flank pain  musculoskeletal:  no joint pain, no joint swelling  skin:  back wound with vac dressing   neurology:  no headache, no seizure, no change in mental status  psych: no anxiety, no depression         Allergies  No Known Allergies        ANTIMICROBIALS:  piperacillin/tazobactam IVPB.. 3.375 every 8 hours  vancomycin  IVPB 1000 every 12 hours      OTHER MEDS:  aluminum hydroxide/magnesium hydroxide/simethicone Suspension 30 milliLiter(s) Oral every 6 hours PRN  heparin   Injectable 5000 Unit(s) SubCutaneous every 12 hours  HYDROmorphone  Injectable 0.5 milliGRAM(s) IV Push every 6 hours PRN  losartan 25 milliGRAM(s) Oral daily  melatonin 6 milliGRAM(s) Oral at bedtime  ondansetron Injectable 4 milliGRAM(s) IV Push once PRN  traMADol 25 milliGRAM(s) Oral every 6 hours PRN      Vital Signs Last 24 Hrs  T(C): 36.6 (22 Feb 2022 11:06), Max: 37.3 (21 Feb 2022 16:39)  T(F): 97.9 (22 Feb 2022 11:06), Max: 99.1 (21 Feb 2022 16:39)  HR: 53 (22 Feb 2022 11:06) (53 - 60)  BP: 144/73 (22 Feb 2022 11:06) (121/66 - 151/70)  BP(mean): --  RR: 18 (22 Feb 2022 11:06) (18 - 18)  SpO2: 97% (22 Feb 2022 11:06) (93% - 98%)    Physical Exam:  Constitutional: non-toxic, no distress  HEAD/EYES: anicteric, no conjunctival injection  ENT:  supple, no thrush  Cardiovascular:   normal S1, S2, no murmur, no edema  Respiratory:  clear BS bilaterally, no wheezes, no rales  GI:  soft, non-tender, normal bowel sounds  :  no guy, no CVA tenderness  Musculoskeletal:  no synovitis, normal ROM  Neurologic: awake and alert, normal strength, no focal findings  Skin:  large deep wound vac present on the center of her back with mild tenderness and mild erythema of surrounding tissues   Heme/Onc: no lymphadenopathy   Psychiatric:  awake, alert, appropriate mood    WBC Count: 8.78 K/uL (02-22 @ 08:18)  WBC Count: 8.39 K/uL (02-21 @ 03:34)  WBC Count: 7.32 K/uL (02-20 @ 07:18)  WBC Count: 9.87 K/uL (02-19 @ 08:05)  WBC Count: 16.52 K/uL (02-18 @ 15:20)  WBC Count: 19.65 K/uL (02-18 @ 04:06)  WBC Count: 20.40 K/uL (02-17 @ 19:34)                            8.6    8.78  )-----------( 550      ( 22 Feb 2022 08:18 )             30.2       02-22    142  |  109<H>  |  9   ----------------------------<  108<H>  4.5   |  27  |  0.74    Ca    8.6      22 Feb 2022 08:18  Phos  3.8     02-22  Mg     2.7     02-22    TPro  6.0  /  Alb  2.0<L>  /  TBili  0.3  /  DBili  x   /  AST  22  /  ALT  25  /  AlkPhos  131<H>  02-22          Creatinine Trend: 0.74<--, 0.73<--, 0.69<--, 0.74<--, 0.76<--, 0.76<--      MICROBIOLOGY:  v  .Other Infection of Back #1  02-18-22   No growth  --  --      .Blood Blood  02-17-22   No growth to date.  --  --      .Blood Blood  02-17-22   No growth to date.  --  --      C-Reactive Protein, Serum: 327 (02-17)                RADIOLOGY:

## 2022-02-24 NOTE — DISCHARGE NOTE NURSING/CASE MANAGEMENT/SOCIAL WORK - NSDCPEFALRISK_GEN_ALL_CORE
For information on Fall & Injury Prevention, visit: [email or web address] OR  [email or web address] OR  https://www.cdc.gov/steadi/patient.html

## 2022-02-24 NOTE — DISCHARGE NOTE NURSING/CASE MANAGEMENT/SOCIAL WORK - PATIENT PORTAL LINK FT
You can access the FollowMyHealth Patient Portal offered by [facility] by registering at the following website: [email or web address]. By joining [facility]’s FollowMyHealth portal, you will also be able to view your health information using other applications (apps) compatible with our system.

## 2022-02-24 NOTE — DISCHARGE NOTE NURSING/CASE MANAGEMENT/SOCIAL WORK - NSDCVIVACCINE_GEN_ALL_CORE_FT
No Vaccines Administered. Tdap; 24-Feb-2022 18:21; Anastasia Modi (RN); [employer]; [id number] (Exp. Date: 09-Sep-2023); IntraMuscular; Deltoid Left.; 0.5 milliLiter(s); VIS (VIS Published: 09-May-2013, VIS Presented: 24-Feb-2022);

## 2022-03-07 ENCOUNTER — APPOINTMENT (OUTPATIENT)
Dept: SURGERY | Facility: CLINIC | Age: 72
End: 2022-03-07
Payer: MEDICARE

## 2022-03-07 VITALS
DIASTOLIC BLOOD PRESSURE: 74 MMHG | HEART RATE: 82 BPM | RESPIRATION RATE: 16 BRPM | HEIGHT: 63 IN | BODY MASS INDEX: 32.25 KG/M2 | TEMPERATURE: 97.3 F | OXYGEN SATURATION: 96 % | SYSTOLIC BLOOD PRESSURE: 155 MMHG | WEIGHT: 182 LBS

## 2022-03-07 PROBLEM — Z00.00 ENCOUNTER FOR PREVENTIVE HEALTH EXAMINATION: Status: ACTIVE | Noted: 2022-03-07

## 2022-03-07 PROCEDURE — 99024 POSTOP FOLLOW-UP VISIT: CPT

## 2022-03-10 NOTE — HISTORY OF PRESENT ILLNESS
[de-identified] : 72 yo female s/p debridement of necrotizing soft tissue infection of back on February 17th, 2022, discharge with VAC dressing. Reports feeling well, no fevers/chills, pain improving. Has not had a chance to follow up with plastic surgeon.

## 2022-03-10 NOTE — ASSESSMENT
[FreeTextEntry1] : 72 yo female with history as above, recovering well from debridement of necrotizing soft tissue infection of back. Patient to keep her appointment with Plastic Surgery as planned. Follow up with me PRN.

## 2022-03-10 NOTE — PHYSICAL EXAM
[Normal Rate and Rhythm] : normal rate and rhythm [Calm] : calm [de-identified] : Well appearing, no acute distress [de-identified] : non labored [de-identified] : Upper left back wound evaluated after removing VAC dressing. The wound is approximately 20cm x 10cm and 3cm deep with clean base, healthy skin edges, no purulence, no bleeding. No surrounding erythema. Wound dressed again with moist gauze covered with dry gauze and tape.

## 2022-03-28 ENCOUNTER — APPOINTMENT (OUTPATIENT)
Dept: SURGERY | Facility: CLINIC | Age: 72
End: 2022-03-28
Payer: MEDICARE

## 2022-03-28 VITALS
RESPIRATION RATE: 16 BRPM | WEIGHT: 182 LBS | BODY MASS INDEX: 32.25 KG/M2 | HEIGHT: 63 IN | TEMPERATURE: 97.1 F | SYSTOLIC BLOOD PRESSURE: 162 MMHG | HEART RATE: 99 BPM | OXYGEN SATURATION: 96 % | DIASTOLIC BLOOD PRESSURE: 78 MMHG

## 2022-03-28 PROCEDURE — 99024 POSTOP FOLLOW-UP VISIT: CPT

## 2022-03-28 NOTE — PHYSICAL EXAM
[Oriented to Person] : oriented to person [Oriented to Place] : oriented to place [Oriented to Time] : oriented to time [Calm] : calm [de-identified] : well appearing, no acute dsitress [de-identified] : back wound is now approximately 12cm by 7cm with very healthy grannulation tissue. Surrounding skin appears healthy.

## 2022-03-28 NOTE — ASSESSMENT
[FreeTextEntry1] : 70 yo female with history as above, recovering well from debridement of necrotizing soft tissue infection of back. I removed the VAC dressing and noted that her wound appears healthy and much contracted from last time. We spoke at length about importance of consistent wound care, and I encouraged her to call me with any issues or questions. Follow up with me PRN.

## 2022-10-21 NOTE — PATIENT PROFILE ADULT - ARRIVAL FROM
Home Peng Advancement Flap Text: The defect edges were debeveled with a #15 scalpel blade.  Given the location of the defect, shape of the defect and the proximity to free margins a Peng advancement flap was deemed most appropriate.  Using a sterile surgical marker, an appropriate advancement flap was drawn incorporating the defect and placing the expected incisions within the relaxed skin tension lines where possible. The area thus outlined was incised deep to adipose tissue with a #15 scalpel blade.  The skin margins were undermined to an appropriate distance in all directions utilizing iris scissors.

## 2023-03-24 ENCOUNTER — TRANSCRIPTION ENCOUNTER (OUTPATIENT)
Age: 73
End: 2023-03-24

## 2023-07-28 NOTE — ASU PREOP CHECKLIST - INTERNAL PROSTHESES
[address]  Consult Note  Name: Urbano Puri  MRN: [id number]  Unit/Bed#: -01 I Date of Admission: 7/27/2023   Date of Service: 7/28/2023 I Hospital Day: 1    Assessment/Plan   Symptomatic carotid artery stenosis, left  Assessment & Plan  26-year-old female, right-hand-dominant with HTN, HLD, DM, CKD, history of lacunar CVA, former smoker, multivessel CAD, AIOD/PAD w/ short distance claudication, renal artery stenosis, mesenteric stenosis, bladder mass s/p TURBT '20, bilateral carotid stenosis presents with right hand weakness, speech difficulties. Stroke pathway. CTA of the neck showed significant bilateral ICA stenosis, intracranial disease, vertebral artery stenosis. Vascular consulted for concern for intracranial extracranial symptomatic rotted stenosis    Diagnostics:  -CTA head and neck 7/27/23 : Hypodensity and loss of gray-white matter differentiation involving left occipital and inferior parietal lobe concerning for evolving acute or subacute infarct. Chronic microangiopathic change. High-grade stenosis of the left PCA at proximal P2 segment. Occluded versus hypoplastic left P1 segment with patent P-comm perfusing left PCA. Severe left and moderate right bilateral supraclinoid ICA stenosis. Severe stenosis of the left vertebral artery origin. Patent narrow caliber left vertebral artery in the cervical and pre-PICA intracranial segments with multifocal severe stenosis. Post PICA segment is occluded. Multifocal high-grade stenosis of the right intracranial vertebral artery. Advanced atherosclerotic change of bilateral cervical carotid arteries. Estimated 85-90% left and 80% right proximal ICA stenosis. Atherosclerotic disease of the aortic arch with linear defect at the origin of left subclavian artery. Differentials include ulcerated plaque versus focal dissection flap (likely chronic).   -MRI of the brain 7/27/23 : Large area of acute to subacute ischemia left PCA distribution occipital lobe. Small foci of acute to subacute ischemia in the left frontal and left parietal/temporal lobes MCA distribution   -CV duplex 7/28/23 : Bilateral ICA greater than 70% stenosis, right ICA velocities 301/88, ratio 4.9 and left ICA velocities 571/152, ratio 8.68  -Echo pending to be done 7/28/23    Recommendations   -Severe bilateral ICA stenosis with large acute to subacute left PCA and small acute to subacute left frontal and parietal/temporal infarct  -Continues with mild word finding difficulties, improving. Has significant improvement in right upper extremity weakness  -Patient would benefit from left carotid revascularization, L TCAR  -Echo pending. Will consult cardiology for preoperative risk stratification. History of multivessel CAD  -Would appreciate neurology input for timing of carotid revascularization. Considering 4-6 weeks for surgery to allow for recovery from current stroke  -Was on Plavix prior to admission. Switched to Danial Schein. Also on aspirin 81 mg  -Intolerant to statin therapy, on fish oil. Started on Pravachol  -Will arrange for outpatient follow up with vascular surgeon in 2 weeks of discharge to discuss TCAR   -Discussed with [facility]   -Discussed with Dr Za Calle         * CVA (cerebral vascular accident) [facility])  [address] [facility] with word finding difficulties, right-sided weakness  -Stroke pathway  -MRI of the brain with a large area of acute/subacute ischemia left PCA distribution occipital lobe and left acute/subacute frontal and parietal infarct  -Started on Brilinta  -Intolerant to statins. Due to severe myalgias. Started on pravastatin  -Neurology following   -Significant bilateral carotid stenosis.   Planning left TCAR    Diabetes [facility])  Assessment & Plan  Lab Results   Component Value Date    HGBA1C 9.1 (H) 07/27/2023    HGBA1C 9.2 (H) 07/27/2023       Recent Labs     07/27/23  1734 07/27/23  2054 07/28/23  0701 07/28/23  1121   POCGLU 215* 228* 173* 263*       Blood Sugar Average: Last 72 hrs:  (P) 219.75       -Optimize glucose control  -Per [facility]       Consulting Service: Neurology    Reason for consult: Stroke pathway, intracranial versus extracranial left carotid stenosis    HPI: Raymond Ritchie is a 67 y.o. female with HTN, HLD, DM, CKD, history of lacunar CVA, former smoker, multivessel CAD, AIOD/PAD w/ short distance claudication, renal artery stenosis, mesenteric stenosis, bladder mass s/p TURBT '20, bilateral carotid stenosis. On Saturday last week she had speech difficulties while on the phone with a relative. On Sunday she appeared normal.  On Monday her son noticed right hand weakness and difficulty finding words which continued to worsen over the next few days until presenting to the ED yesterday. Stroke pathway initiated. CTA of the head and neck showed significant bilateral ICA stenosis and MRI positive for left parietal and frontal lobe infarcts. Vascular was consulted for evaluation of symptomatic left carotid. Upon examination she notes significant improvement in right hand symptoms. She has good Strube strength in the right hand. She denies amaurosis fugax though does report bright luminous and vision in both eyes, worse in the left compared to the right. She continues with mild word finding difficulties. She ambulates with a walker and uses a scooter at home. She has short distance bilateral lower extremity claudication symptoms, worsened over the last 6 months. No ischemic rest pain or ischemic tissue loss. She was on Plavix prior to admission. She is a former smoker. She is intolerant to statin therapy due to severe myalgias.     Review of Systems:  General: negative  Cardiovascular: no chest pain or dyspnea on exertion  Respiratory: no cough, shortness of breath, or wheezing  Gastrointestinal: no abdominal pain, change in bowel habits, or black or bloody stools  Genitourinary ROS: no dysuria, trouble voiding, or hematuria  Musculoskeletal ROS: positive for - gait disturbance  Neurological ROS: positive for - weakness  Hematological and Lymphatic ROS: negative  Dermatological ROS: negative  Psychological ROS: negative  Ophthalmic ROS: negative  ENT ROS: negative    Past Medical History:  Past Medical History:   Diagnosis Date   • Arthritis    • Chronic kidney disease    • Endometriosis    • High cholesterol    • Hypertension    • Kidney disease, chronic, stage III (moderate, EGFR 30-59 ml/min) ([facility])    • Lumbar disc herniation    • Neuropathy    • Peripheral vascular disease ([facility])    • Pneumonia    • Shoulder injury     left   • Spinal stenosis    • Stroke ([address] ) 2015    Memory loss       Past Surgical History:  Past Surgical History:   Procedure Laterality Date   • CARDIAC CATHETERIZATION     • COLONOSCOPY     • FL RETROGRADE PYELOGRAM  4/6/2020   • FRACTURE SURGERY Right     ankle   • OVARIAN CYST SURGERY     • NC CYSTO BLADDER W/URETERAL CATHETERIZATION Bilateral 4/6/2020    Procedure: CYSTOSCOPY WITH RETROGRADE PYELOGRAM;  Surgeon: Sandy Payton MD;  [facility];  Service: Urology   • NC CYSTO W/INSERT URETERAL STENT Right 4/6/2020    Procedure: INSERTION STENT URETERAL;  Surgeon: Sandy Payton MD;  [facility];  Service: Urology   • NC CYSTO W/REMOVAL OF TUMORS SMALL N/A 4/6/2020    Procedure: TRANSURETHRAL RESECTION OF BLADDER TUMOR (TURBT);   Surgeon: Sandy Pyaton MD;  [facility];  Service: Urology   • ROTATOR CUFF REPAIR Left    • TONSILLECTOMY         Social History:  Social History     Substance and Sexual Activity   Alcohol Use Never     Social History     Substance and Sexual Activity   Drug Use Never     Social History     Tobacco Use   Smoking Status Former   • Packs/day: 2.00   • Years: 40.00   • Total pack years: 80.00   • Types: Cigarettes   • Start date: 1966   • Quit date: 7/27/2020   • Years since quitting: 3.0   Smokeless Tobacco Never       Family History:  Family History   Problem Relation Age of Onset   • Hypertension Mother    • Heart disease Mother         Valvular   • Hyperlipidemia Mother    • Hypertension Father    • Heart defect Father         Cardiomegaly   • Stroke Sister         Cerebrovascular Accident   • Arthritis Brother    • Other Brother         Back Disorder       Allergies: Allergies   Allergen Reactions   • Fenofibrate Other (See Comments)      blood in urine  hx  Kidney Failure   • Colesevelam Other (See Comments)      leg pains   • Colestipol Itching and Other (See Comments)      Swelling lower legs   • Ezetimibe GI Intolerance   • Statins Myalgia       Medications:  Current Facility-Administered Medications   Medication Dose Route Frequency   • amLODIPine (NORVASC) tablet 10 mg  10 mg Oral Daily   • [START ON 7/29/2023] aspirin chewable tablet 81 mg  81 mg Oral Daily   • cloNIDine (CATAPRES-TTS-3) 0.3 mg/24 hr TD weekly patch  1 patch Transdermal Weekly   • fish oil capsule 1,000 mg  1,000 mg Oral Daily   • heparin (porcine) subcutaneous injection 5,000 Units  5,000 Units Subcutaneous Q8H [address]   • insulin lispro (HumaLOG) 100 units/mL subcutaneous injection 2-12 Units  2-12 Units Subcutaneous TID With Meals   • insulin lispro (HumaLOG) 100 units/mL subcutaneous injection 2-12 Units  2-12 Units Subcutaneous HS   • labetalol (NORMODYNE) injection 10 mg  10 mg Intravenous Q6H PRN   • labetalol (NORMODYNE) tablet 300 mg  300 mg Oral BID   • losartan (COZAAR) tablet 100 mg  100 mg Oral Daily   • pravastatin (PRAVACHOL) tablet 80 mg  80 mg Oral Daily With Dinner   • ticagrelor (BRILINTA) tablet 90 mg  90 mg Oral Q12H [address]       Vitals:  Vitals:    07/28/23 1121   BP: 157/58   Pulse: 68   Resp:    Temp: 97.6 °F (36.4 °C)   SpO2: 97%       I/Os:  No intake/output data recorded. No intake/output data recorded.     Lab Results and Cultures:   CBC with diff:   Lab Results   Component Value Date    WBC 9.46 07/28/2023    HGB 15.2 07/28/2023    HCT 43.2 07/28/2023 MCV 85 07/28/2023     07/28/2023    RBC 5.06 07/28/2023    MCH 30.0 07/28/2023    MCHC 35.2 07/28/2023    RDW 12.8 07/28/2023    MPV 9.8 07/28/2023    NRBC 0 07/27/2023   ,   BMP/CMP:  Lab Results   Component Value Date    K 3.6 07/28/2023    K 3.8 10/20/2020     07/28/2023    CL 98 10/20/2020    CO2 24 07/28/2023    CO2 23 10/20/2020    BUN 18 07/28/2023    BUN 45 (H) 10/20/2020    CREATININE 1.41 (H) 07/28/2023    CALCIUM 10.6 (H) 07/28/2023    AST 11 06/14/2023    AST 14 09/08/2020    ALT 21 06/14/2023    ALT 13 09/08/2020    ALKPHOS 98 06/14/2023    EGFR 36 07/28/2023   ,   Lipid Panel: No results found for: "CHOL",   Coags:   Lab Results   Component Value Date    PTT 29 01/22/2022    INR 1.07 01/22/2022   ,     Blood Culture:   Lab Results   Component Value Date    BLOODCX No Growth After 5 Days. 01/22/2022   ,   Urinalysis:   Lab Results   Component Value Date    COLORU Yellow 08/11/2022    CLARITYU Clear 08/11/2022    SPECGRAV 1.025 08/11/2022    PHUR 6.0 08/11/2022    LEUKOCYTESUR Moderate (A) 08/11/2022    NITRITE Negative 08/11/2022    GLUCOSEU Negative 08/11/2022    KETONESU Negative 08/11/2022    BILIRUBINUR Negative 08/11/2022    BLOODU Trace-Intact (A) 08/11/2022   ,   Urine Culture:   Lab Results   Component Value Date    URINECX >100,000 cfu/ml - Presumptive Escherichia coli (A) 04/06/2022   ,   Wound Culure: No results found for: "WOUNDCULT"    Imaging:  CTA head and neck   CTA NECK AND BRAIN WITH AND WITHOUT CONTRAST     INDICATION: Neuro deficit, acute, stroke suspected  Aphasia and dysarthria, right upper extremity sensorimotor deficit     COMPARISON:   Brain MRI 10/16/2014     TECHNIQUE:  Routine CT imaging of the Brain without contrast.  Post contrast imaging was performed after administration of iodinated contrast through the neck and brain. Post contrast axial 0.625 mm images timed to opacify the arterial system.     3D rendering was performed on an independent workstation.    MIP reconstructions performed. Coronal reconstructions were performed of the noncontrast portion of the brain.     Radiation dose length product (DLP) for this visit:  9926 mGy-cm . This examination, like all CT scans performed in the [facility], was performed utilizing techniques to minimize radiation dose exposure, including the use of iterative   reconstruction and automated exposure control.     IV Contrast:  85 mL of iohexol (OMNIPAQUE)     IMAGE QUALITY:   Diagnostic     FINDINGS:  NONCONTRAST BRAIN  PARENCHYMA:     There is hypoattenuation and loss of gray-white matter differentiation involving left occipital lobe and inferior left parietal lobe.     Nonspecific  decreased attenuation involving periventricular and subcortical white matter, most compatible with moderate to advanced microangiopathic change.     No intracranial mass, mass effect or midline shift. No acute parenchymal hemorrhage.     VENTRICLES AND EXTRA-AXIAL SPACES:  Normal for the patient's age.     VISUALIZED ORBITS: Normal visualized orbits.     PARANASAL SINUSES: There is mild mucosal thickening of the left maxillary sinus.     CERVICAL VASCULATURE  AORTIC ARCH AND GREAT VESSELS: Atherosclerotic disease of aortic arch and great vessel origins without high-grade stenosis. There is linear lucency at the origin of the left subclavian artery (series 4 image 224)     RIGHT VERTEBRAL ARTERY CERVICAL SEGMENT: Moderate atherosclerotic narrowing at the origin the vessel is patent throughout the neck without high-grade stenosis.     LEFT VERTEBRAL ARTERY CERVICAL SEGMENT: Severe atherosclerotic narrowing at the origin the vessel is narrow caliber throughout the neck with multifocal stenosis.     RIGHT EXTRACRANIAL CAROTID SEGMENT: Partially calcified atherosclerotic plaque at the bifurcation and proximal internal carotid artery.  There is estimated 80% stenosis at the proximal internal carotid artery per NASCET criteria.        LEFT EXTRACRANIAL CAROTID SEGMENT: Partially calcified atherosclerotic plaque at the bifurcation and proximal internal carotid artery. There is estimated 85-90% stenosis of the proximal internal carotid artery per NASCET criteria           INTRACRANIAL VASCULATURE  INTERNAL CAROTID ARTERIES: Atherosclerotic disease of intracranial internal carotid artery with severe left and moderate right supraclinoid ICA stenosis. Normal ophthalmic artery origins.     ANTERIOR CIRCULATION:  Normal A1 segments. Bilateral anterior cerebral arteries are unremarkable. Normal anterior comminuting artery.     MIDDLE CEREBRAL ARTERY CIRCULATION: Bilateral M1 segments and major M2 branches are patent without high-grade stenosis.     DISTAL VERTEBRAL ARTERIES: Multifocal high-grade stenosis of pre-PICA left V4 segment with occluded left post PICA segment. Patent right intracranial vertebral artery with multifocal severe stenosis stenosis more pronounced in the proximal vessel and at   vertebrobasilar junction. PICA origins are patent.        BASILAR ARTERY: Basilar artery is patent. Normal superior cerebellar arteries.        POSTERIOR CEREBRAL ARTERIES: Occluded versus hypoplastic left P1 segment with patent P-comm perfusing left PCA. There is high-grade stenosis in the proximal left P2 segment.     Persistent fetal origin of the right posterior cerebral artery. Right PCA is patent without high-grade stenosis.       DURAL VENOUS SINUSES:  Unremarkable.        NON VASCULAR ANATOMY     BONY STRUCTURES: No acute or aggressive appearing osseous abnormality.     SOFT TISSUES OF THE NECK:  Unremarkable.     THORACIC INLET:  Unremarkable.     IMPRESSION:     1. Hypodensity and loss of gray-white matter differentiation involving left occipital and inferior parietal lobe concerning for evolving acute or subacute infarct. 2.  Chronic microangiopathic change. 3.  High-grade stenosis of the left PCA at proximal P2 segment.  Occluded versus hypoplastic left P1 segment with patent P-comm perfusing left PCA. 4.  Severe left and moderate right bilateral supraclinoid ICA stenosis. 5.  Severe stenosis of the left vertebral artery origin. Patent narrow caliber left vertebral artery in the cervical and pre-PICA intracranial segments with multifocal severe stenosis. Post PICA segment is occluded. 6.  Multifocal high-grade stenosis of the right intracranial vertebral artery. 7.  Advanced atherosclerotic change of bilateral cervical carotid arteries. Estimated 85-90% left and 80% right proximal ICA stenosis. 8.  Atherosclerotic disease of the aortic arch with linear defect at the origin of left subclavian artery. Differentials include ulcerated plaque versus focal dissection flap (likely chronic).       MRI brain     MRI BRAIN WITHOUT CONTRAST     INDICATION: stroke.     COMPARISON:   Same date CTA     TECHNIQUE:  Multiplanar, multisequence imaging of the brain was performed.        IMAGE QUALITY:  Diagnostic.     FINDINGS:     BRAIN PARENCHYMA:  There is no discrete mass, mass effect or midline shift. There is no intracranial hemorrhage.     Large area of diffusion signal abnormality in the left occipital lobe PCA distribution with long TR signal abnormality smaller foci of cortical diffusion signal abnormality in the left frontal and left parietal and posterior left temporal consistent with   acute to subacute ischemia.   Small scattered hyperintensities on T2/FLAIR imaging are noted in the periventricular and subcortical white matter demonstrating an appearance that is statistically most likely to represent advanced microangiopathic change.     VENTRICLES:  Normal for the patient's age.     SELLA AND PITUITARY GLAND:  Normal.     ORBITS:  Normal.     PARANASAL SINUSES:  Normal.     VASCULATURE:  Evaluation of the major intracranial vasculature demonstrates appropriate flow voids.     CALVARIUM AND SKULL BASE:  Normal.     EXTRACRANIAL SOFT TISSUES: Normal.     IMPRESSION:        1. Large area of acute to subacute ischemia left PCA distribution occipital lobe. 2. Small foci of acute to subacute ischemia in the left frontal and left parietal/temporal lobes MCA distribution.     Findings were marked as "immediate"in Epic and will now be related to the ordering physician or covering clinical team by the radiology liaison.            VAS carotid complete study     [facility] REPORT  CLINICAL:  Indications:  Patient presents to evaluate for carotid artery stenosis s/p recent TIA/CVA. The patient admits to dizziness when laying down. Operative History:  2020-02-26 Cardiac catheterization  Risk Factors  The patient has history of Obesity, HTN, Diabetes (NIDDM (Diet)),  Hyperlipidemia, CKD, PAD, CAD and previous smoking (quit <1yr ago). FINDINGS:     Right        Impression  PSV  EDV (cm/s)  Direction of Flow  Ratio    Dist. ICA                 61          13                      1.00    Mid. ICA                 106          18                      1.73    Prox. ICA    70%+        301          88                      4.90    Dist CCA                  68          12                              Mid CCA                   61          11                      1.48    Prox CCA                  42           7                      0.54    Ext Carotid               79           5                      1.29    Prox Vert                 27           8  Antegrade                   Subclavian               166           0                              Innominate                77           7                                 Left         Impression  PSV  EDV (cm/s)  Direction of Flow  Ratio    Dist. ICA                 15           4                      0.23    Mid. ICA                  22           7                      0.33    Prox.  ICA    70%+        571         152                      8.68    Dist CCA                  36           6 Mid CCA                   66           0                      1.02    Prox CCA                  65           0                              Ext Carotid               45           0                      0.69    Prox Vert                 43           5  Antegrade                   Subclavian               106           0                                       CONCLUSION:  RIGHT:  There is 70+% stenosis noted in the internal carotid artery. Plaque is  heterogenous/homogenous and irregular/smooth. Vertebral artery flow is antegrade. There is no significant subclavian artery  disease. LEFT:  There is 70+% stenosis noted in the internal carotid artery. Plaque is  heterogenous/homogenous and irregular/smooth. Vertebral artery flow is antegrade. There is no significant subclavian artery  disease. Compared to previous study on 2/17/22 , there is an increase in disease in the  bilateral ICAs  Recommend repeat testing in 6 months as per protocol unless otherwise  indicated. Physical Exam:    General appearance: alert and oriented, in no acute distress  Head: Normocephalic, without obvious abnormality, atraumatic  Eyes: Conjunctive a clear. Extraocular movements intact  Throat: lips, mucosa, and tongue normal; teeth and gums normal  Neck: no adenopathy, no carotid bruit and supple, symmetrical, trachea midline  Back: symmetric, no curvature. ROM normal. No CVA tenderness. Lungs: clear to auscultation bilaterally  Chest wall: no tenderness  Heart: regular rate and rhythm, S1, S2 normal, no murmur, click, rub or gallop  Abdomen: soft, non-tender; bowel sounds normal; no masses,  no organomegaly  Genitalia: deferred  Rectal: deferred  Extremities: extremities normal, warm and well-perfused; no cyanosis, clubbing, or edema  Skin: Skin color, texture, turgor normal. No rashes or lesions  Neurologic: Grossly normal.  Face symmetrical.  Tongue midline. Equal upper and lower extremity  strength.       Pulse exam:  Femoral: Right: non-palpable Left: non-palpable  Popliteal: Right: non-palpable Left: non-palpable  DP: Right: non-palpable Left: non-palpable  PT: Right: non-palpable Left: non-palpable    GRACIELA Spangler  7/28/2023 no

## 2023-08-29 NOTE — H&P ADULT - EXTREMITIES
Hide Include Location In Plan Question?: No
Detail Level: Simple
Include Location In Plan?: Yes
detailed exam

## 2024-02-11 NOTE — H&P ADULT - NEUROLOGICAL
AYANNA evaluation complete. Pt hasn't been using CPAP and currently refusing use.    detailed exam details…

## 2024-12-22 NOTE — ED ADULT NURSE NOTE - SUICIDE SCREENING QUESTION 1
May we please add PT/OT evals due to hospital stay greater than 3 days for strengthening, gait training, and safety teaching during ADL's? No